# Patient Record
Sex: MALE | Race: WHITE | NOT HISPANIC OR LATINO | Employment: OTHER | URBAN - METROPOLITAN AREA
[De-identification: names, ages, dates, MRNs, and addresses within clinical notes are randomized per-mention and may not be internally consistent; named-entity substitution may affect disease eponyms.]

---

## 2021-09-13 ENCOUNTER — HISTORICAL (OUTPATIENT)
Dept: ADMINISTRATIVE | Facility: HOSPITAL | Age: 66
End: 2021-09-13

## 2022-04-11 ENCOUNTER — HISTORICAL (OUTPATIENT)
Dept: ADMINISTRATIVE | Facility: HOSPITAL | Age: 67
End: 2022-04-11
Payer: MEDICARE

## 2022-04-29 VITALS
HEIGHT: 76 IN | OXYGEN SATURATION: 96 % | SYSTOLIC BLOOD PRESSURE: 128 MMHG | WEIGHT: 243.63 LBS | BODY MASS INDEX: 29.67 KG/M2 | DIASTOLIC BLOOD PRESSURE: 72 MMHG

## 2022-05-09 ENCOUNTER — OFFICE VISIT (OUTPATIENT)
Dept: WOUND CARE | Facility: HOSPITAL | Age: 67
End: 2022-05-09
Attending: NURSE PRACTITIONER
Payer: MEDICARE

## 2022-05-09 VITALS
TEMPERATURE: 98 F | SYSTOLIC BLOOD PRESSURE: 133 MMHG | DIASTOLIC BLOOD PRESSURE: 66 MMHG | WEIGHT: 240 LBS | HEIGHT: 73 IN | HEART RATE: 54 BPM | BODY MASS INDEX: 31.81 KG/M2

## 2022-05-09 DIAGNOSIS — E77.8 HYPOPROTEINEMIA: ICD-10-CM

## 2022-05-09 DIAGNOSIS — L23.1 CONTACT DERMATITIS DUE TO ADHESIVE BANDAGE: ICD-10-CM

## 2022-05-09 DIAGNOSIS — T87.9 COMPLICATION OF AMPUTATION STUMP OF LEFT LOWER EXTREMITY: ICD-10-CM

## 2022-05-09 DIAGNOSIS — R73.03 PRE-DIABETES: ICD-10-CM

## 2022-05-09 DIAGNOSIS — A49.8 INFECTION DUE TO ENTEROBACTER CLOACAE: ICD-10-CM

## 2022-05-09 DIAGNOSIS — A49.01 MSSA (METHICILLIN SUSCEPTIBLE STAPHYLOCOCCUS AUREUS): ICD-10-CM

## 2022-05-09 DIAGNOSIS — F10.11 HISTORY OF ALCOHOL ABUSE: ICD-10-CM

## 2022-05-09 DIAGNOSIS — T81.89XD NON-HEALING SURGICAL WOUND, SUBSEQUENT ENCOUNTER: ICD-10-CM

## 2022-05-09 DIAGNOSIS — Z99.3 WHEELCHAIR CONFINEMENT STATUS: ICD-10-CM

## 2022-05-09 DIAGNOSIS — D50.9 IRON DEFICIENCY ANEMIA, UNSPECIFIED IRON DEFICIENCY ANEMIA TYPE: ICD-10-CM

## 2022-05-09 DIAGNOSIS — Z89.512 STATUS POST BELOW-KNEE AMPUTATION OF LEFT LOWER EXTREMITY: ICD-10-CM

## 2022-05-09 PROBLEM — T81.89XA NON-HEALING SURGICAL WOUND: Status: ACTIVE | Noted: 2022-05-09

## 2022-05-09 PROCEDURE — 99213 PR OFFICE/OUTPT VISIT, EST, LEVL III, 20-29 MIN: ICD-10-PCS | Mod: ,,, | Performed by: NURSE PRACTITIONER

## 2022-05-09 PROCEDURE — 99213 OFFICE O/P EST LOW 20 MIN: CPT | Mod: ,,, | Performed by: NURSE PRACTITIONER

## 2022-05-09 PROCEDURE — 99211 OFF/OP EST MAY X REQ PHY/QHP: CPT

## 2022-05-09 RX ORDER — GABAPENTIN 300 MG/1
300 CAPSULE ORAL 2 TIMES DAILY
COMMUNITY
Start: 2021-07-21

## 2022-05-09 RX ORDER — SPIRONOLACTONE 50 MG/1
50 TABLET, FILM COATED ORAL DAILY
COMMUNITY
Start: 2022-05-06

## 2022-05-09 RX ORDER — HYDRALAZINE HYDROCHLORIDE 25 MG/1
25 TABLET, FILM COATED ORAL DAILY
COMMUNITY
Start: 2022-05-06

## 2022-05-09 RX ORDER — POTASSIUM CHLORIDE 20 MEQ/1
20 TABLET, EXTENDED RELEASE ORAL DAILY
COMMUNITY
Start: 2022-05-06

## 2022-05-09 NOTE — PROGRESS NOTES
Chief Complaint:  Non-healing left BKA Stump Wound     History of Present Illness:  65 yo White male being seen for follow-up of post-operative left BKA stump following surgery done here @ [facility] on 7/9/2021, complicated by osteomyelitis, and dehiscence of incision line. Hospitalized 9/13/2021 - 9/16/2021 for OR debridement of non-healing stump wounds. Discharged to [facility] on 9/16/2021 for ongoing IV antibiotics and NPWT. Discharged home 10/13/2021, with [employer] ([employer]).  Mr. Bone's new phone number: [phone].     Hx includes pre-diabetes, CKD2, A. fib (Eliquis), anemia, GERD, BPH, HLD, HTN, bradycardia, hx ETOH abuse, and obesity (BMI 31.66). During July hospitalization, he was noted to be bradycardic and underwent a Lexiscan which was negative for any ischemia or scarring. Echo performed 7/5/2021 showed reduced ejection fraction at 45 to 50% and biatrial enlargement. Never smoker.  with one grown son, whom he is staying with presently. Has an apartment of his own that is handicap friendly inside; however, outside is not. PCP is Dr. Giovany Duron in [place]. Stopped drinking ETOH in September; denies ETOH use at this time. Male friend of 10 years is available to assist with transportation to appointments. Current wound care to left stump: cleaning with Microcyn, followed by Silver Alginate Advantage,followed by gauze, kerlix, and nylon stocking to keep all dressings in place.   Prescribed Clobetasol ointment to rash around wound, as-needed.   Advanced skin substitutes: [employer] [facility] applied #1 4/7/2022, and #2 on 4/14/2022. Wound did not respond to plain collagen. Mr. Bone cannot have any kind of tape, including paper tape and silicone tape, on skin. Bactrim DS prescribed 4/28/2022 Q12H X ten days.     Review of Most Recent Labs:  4/26/2022: eGFR >60, with CR 0.9. Albumin 4.1. HgbA1C 6.0%. H&H 15.1 & 45.3.   10/30/2021: eGFR 65, with CR 1.19. HgbA1C 5.6. LDL 90. WBC 7.2.  H&H 12.1 & 39.5. Plts 265. MCV 91.2. MCH 27.9.  10/24/2021: eGFR 62.0, with CR 1.24. Iron 38. Ferritin 67.3. Folate nl. Prealbumin 21.0. B12 lvl 490. WBC 7.6. RBC 3.92. H&H 10.8 &* 35.9. Plts 254. MCV and MCH low @ 27.6.   2021: eGFR >105. WBC 8.6. RBC 2.75. H&H 7.9 & 25.7. Plts 248. MCV and MCH normal.   2021: eGFR 69, with CR 1.14. AST 37. Albumin 2.6. H&H 9.6 & 31.1. Plts 355. MCV and MCH normal.   2021: HgbA1C 5.9.     Wound Culture Results:  2022: Enterobacter cloacae complex and MSSA. Both of those are susceptible to Bactrim.  2022: MSSA. Doxycycline began on 4/6/2022 X 5 days.    Imagin2021 CT of LLE: No evidence of osteo or septic arthritis.  No lower extremity vascular studies on chart.     Today 2022 :  Has finished Bactrim DS since last visit.  Had to use paper tape to keep dressing in place; no recurrent redness over stump.  Denies fevers, chills, odors, redness, pain, or yellow/green drainage.  No new rashes, lesions, or skin breakdown.     2022:  Mr. Bone relays that he took last round of Doxycycline Q12H X 5 days, instead of daily X 10 days, as he relayed last week. Redness around stump all resolved today; no longer itching around wound bed. Has not applied any kind of tape to stump. Denies fevers, chills. No pain in wound. No new skin breakdown, rashes, or other skin issues.    2022:  Doxycycline, ordered on 2022, was prescribed daily, instead of the intended twice/daily frequency. He took that for ten days, daily. Had severe reaction to Versatel and steristrips from last week's application of advanced skin substitute. Skin very red over stump. He left last week's dressing in place, as instructed.     2022:  Has finished Doxycycline, without any new s/e. Had some difficulty keeping kerlix in place last week, and can only use paper tape. He secured dressing with additional gauze and applied an ACE wrap. Denies fevers, chills, redness, pain,  odors, or strike-through drainage. No new skin breakdown, rashes, or other skin issues.    4/7/2020:  Picked up Doxycycline yesterday and has taken two doses; no new s/e with that. Mr. Bone agrees to move forward with an advanced skin substitute today; agrees to leave today's bandage on for one week, until he comes back to clinic. Experienced a reaction to a tape over past week; has a lot of redness around wound. No fevers, chills, odors, or yellow/green drainage. No new skin breakdown, rashes, or other skin issues.    4/1/2022:  States he is able to come to clinic weekly for me to manage his non-healing wound more aggressively. He is agreeable to a trial of a sample advanced skin substitute. Home health continues seeing him; nurse comes Monday. Denies fevers, chills, increased redness, pain, odors, or yellow/green drainage. No new skin breakdown, rashes, or other skin issues. Itching in stump is primarily up over thigh where tubigrip end lays.     3/4/2022:  Wound along middle area of stump has healed. Wound along lateral stump is healing in an abnormal shape. Touches base with [facility] monthly regarding prosthetic leg. No new complaints with wound or current wound care regimen. Has all wound care supplies in home. Home health continues seeing him once week. Denies fevers, chills, increased redness, increased pain, odors, or yellow/green drainage. No new skin breakdown, rashes, or other skin issues. Still wants to be seen on monthly basis.       Review of Systems:   Except as stated in HPI, all other 10 body systems normal      Physical Exam Vitals & Measurements:   Vitals:    05/09/22 0852   BP: 133/66   Pulse: (!) 54   Temp: 98.2 °F (36.8 °C)         General: Bradycardic, asymptomatic with; afebrile. Well-nourished; in no acute distress.   Respiratory: breathing even, quiet, and unlabored. No coughing.   Cardiovascular: No hemosiderin staining or varicosities. Scattered hair distribution over BLE,  including left BKA stump. Musculoskeletal: left BKA; arrived via wheelchair; full range of motion of all extremities  Neurologic: A&O X 3; cranial nerves grossly intact  Psychiatric: Calm, cooperative. Responses appropriate.  Integumentary:  Significant improvement with Silver Alginate Advantage and Bactrim DX X 10 days.      Laterally located full thickness wound:  100% beefy red granulating tissue.  No periwound erythema, purulent drainage, or odors. Wound margins without induration, bogginess, or fluctuance.  Wound dimensions significantly smaller from last visit.                     Assessment/Plan:    Contact Allergic Dermatitis:  Resolved with avoiding all tapes except for paper tape.  Prescribed Clobetasol 0.05% topical ointment twice/daily for any recurrent erythema and itching.   Mr. Bone cannot tolerate any kind of tape, Versatel, steristrips, or adhesive bandages.     Left BKA wound Infection:  Wound Culture Results:  4/21/2022: Enterobacter cloacae complex and MSSA. Both of those are susceptible to Bactrim.  Treated with Bactrim DS 1 tab Q12H for ten days.    Open wound to left BKA (originally) to fat layer:  Wound responded very well to Bactrim DS and two weeks of Silver Alginate Advantage.    Wound Care Today/New Wound Care Treatment Plan:  Clean wound with wound cleanser and pat dry.  Apply Iodiflex, followed by gauze squares, followed by kerlix, followed by nylon stocking to secure dressings. NO TAPE OR ADHESIVES OF ANY KIND CAN BE APPLIED TO STUMP.   Wound care to be done QOD. Instructions reinforced to Mr. Bone that changing dressings daily may delay wound healing, with rationale.   Mr. Bone's allergy to tapes and adhesives, including Versatel and steristrips, is most likely going to impede any future attempts at advanced skin substitutes.     Disruption external operation, subsequent visit (BKA due to osteomyelitis):  S/P left BKA 7/9/2021 with final closure on 7/12/2021.   S/P  debridement in OR here @ [facility] on 9/16/2021, with [facility], where he received [facility]WT and IV antibiotics.   [facility] is following for left leg prosthetic leg.  Will be fitted for that, once wound has healed.      Anemia:  This has been identified as a co-factor towards impaired wound healing.   4/26/2022: H&H 15.1 & 45.3.   10/30/2021: H&H 12.1 & 39.5. Plts 265. MCV 91.2. MCH 27.9.  10/24/2021: Iron 38. Ferritin 67.3. Folate nl. Prealbumin 21.0. B12 lvl 490. RBC 3.92. H&H 10.8 & 35.9. Plts 254. MCV and MCH low @ 27.6.   9/16/2021: RBC 2.75. H&H 7.9 & 25.7. Plts 248. MCV and MCH normal.   Prescribed ferrous sulfate 324 mg, with Vitamin C 500 mg daily. Handout given on foods higher in iron during previous visit.     Hypoproteinemia:  This has been identified as a co-factor towards impaired wound healing.   4/26/2022: Albumin 4.1. Pre-albumin was not drawn with labs, unfortunately.   10/24/2021: pre-albumin 21.0.  7/21/2021: Albumin 2.6.   Prescribed 2-week course of Matt on 11/5/21.   Handout given on foods higher in protein to include/increase in diet during previous wound clinic visit.    Pre-diabetes with peripheral neuropathy:  4/26/2022: HgbA1C 6.0%.   10/30/2021: eGFR 65, with CR 1.19. HgbA1C 5.6. LDL 90.   7/2/2021: HgbA1C 5.9. Not prescribed any antidiabetic medications.   Being managed by PCP.    Hx of ETOH abuse:  Stopped drinking per patient self-report.     Wheelchair confined status:  This has been identified as a co-factor towards impaired wound healing.   Has not had any kind of contact with orthotist for prosthetic leg fitting. Will address this later, as wound progresses toward healing phase.             RTC in one week.  Instructed on s/s of deterioration to call wound clinic for promptly in between clinic visits so we can schedule him that day, or the next day. Copy of today's visit note faxed to [employer].

## 2022-05-16 ENCOUNTER — OFFICE VISIT (OUTPATIENT)
Dept: WOUND CARE | Facility: HOSPITAL | Age: 67
End: 2022-05-16
Attending: NURSE PRACTITIONER
Payer: MEDICARE

## 2022-05-16 VITALS
SYSTOLIC BLOOD PRESSURE: 116 MMHG | RESPIRATION RATE: 18 BRPM | TEMPERATURE: 98 F | WEIGHT: 240 LBS | HEIGHT: 73 IN | DIASTOLIC BLOOD PRESSURE: 69 MMHG | HEART RATE: 66 BPM | BODY MASS INDEX: 31.81 KG/M2

## 2022-05-16 DIAGNOSIS — D50.9 IRON DEFICIENCY ANEMIA, UNSPECIFIED IRON DEFICIENCY ANEMIA TYPE: ICD-10-CM

## 2022-05-16 DIAGNOSIS — E77.8 HYPOPROTEINEMIA: ICD-10-CM

## 2022-05-16 DIAGNOSIS — A49.01 MSSA (METHICILLIN SUSCEPTIBLE STAPHYLOCOCCUS AUREUS): ICD-10-CM

## 2022-05-16 DIAGNOSIS — F10.11 HISTORY OF ALCOHOL ABUSE: ICD-10-CM

## 2022-05-16 DIAGNOSIS — Z99.3 WHEELCHAIR CONFINEMENT STATUS: ICD-10-CM

## 2022-05-16 DIAGNOSIS — Z89.512 STATUS POST BELOW-KNEE AMPUTATION OF LEFT LOWER EXTREMITY: ICD-10-CM

## 2022-05-16 DIAGNOSIS — R73.03 PRE-DIABETES: ICD-10-CM

## 2022-05-16 DIAGNOSIS — T87.9 COMPLICATION OF AMPUTATION STUMP OF LEFT LOWER EXTREMITY: ICD-10-CM

## 2022-05-16 DIAGNOSIS — T81.89XD NON-HEALING SURGICAL WOUND, SUBSEQUENT ENCOUNTER: Primary | ICD-10-CM

## 2022-05-16 DIAGNOSIS — A49.8 INFECTION DUE TO ENTEROBACTER CLOACAE: ICD-10-CM

## 2022-05-16 DIAGNOSIS — L23.1 CONTACT DERMATITIS DUE TO ADHESIVE BANDAGE: ICD-10-CM

## 2022-05-16 PROCEDURE — 99213 OFFICE O/P EST LOW 20 MIN: CPT | Mod: ,,, | Performed by: NURSE PRACTITIONER

## 2022-05-16 PROCEDURE — 99211 OFF/OP EST MAY X REQ PHY/QHP: CPT

## 2022-05-16 PROCEDURE — 99213 PR OFFICE/OUTPT VISIT, EST, LEVL III, 20-29 MIN: ICD-10-PCS | Mod: ,,, | Performed by: NURSE PRACTITIONER

## 2022-05-16 RX ORDER — IRON 18 MG
TABLET ORAL
COMMUNITY
Start: 2021-10-13

## 2022-05-16 NOTE — PROGRESS NOTES
Chief Complaint:  Non-healing left BKA Stump Wound     History of Present Illness:  67 yo White male being seen for follow-up of post-operative left BKA stump following surgery done here @ [facility] on 7/9/2021, complicated by osteomyelitis, and dehiscence of incision line. Hospitalized 9/13/2021 - 9/16/2021 for OR debridement of significant non-healing stump wound. Discharged to [facility] on 9/16/2021 for ongoing IV antibiotics and NPWT. Discharged home 10/13/2021, with [employer] ([place]  Mr. Bone's new phone number: [phone].     Hx includes pre-diabetes, CKD2, A. fib (Eliquis), anemia, GERD, BPH, HLD, HTN, bradycardia, hx ETOH abuse, and obesity (BMI 31.66). During July hospitalization, he was noted to be bradycardic and underwent a Lexiscan which was negative for any ischemia or scarring. Echo performed 7/5/2021 showed reduced ejection fraction at 45 to 50% and biatrial enlargement. Never smoker.  with one grown son, whom he is staying with presently. Has an apartment of his own that is handicap friendly inside; however, outside is not. PCP is Dr. Giovany Duron in [place]. Stopped drinking ETOH in September; denies ETOH use at this time. Male friend of 10 years is available to assist with transportation to appointments. Current wound care to left stump: cleaning with Microcyn, followed by Iodiflex, followed by gauze, kerlix, and nylon stocking to keep all dressings in place.   Prescribed Clobetasol ointment to rash around wound, as-needed.   Advanced skin substitutes: [employer] [facility] applied #1 4/7/2022, and #2 on 4/14/2022. Wound did not respond to plain collagen. Mr. Bone cannot have any kind of tape, including paper tape and silicone tape, on skin. Bactrim DS prescribed 4/28/2022 Q12H X ten days.     Review of Most Recent Labs:  4/26/2022: eGFR >60, with CR 0.9. Albumin 4.1. HgbA1C 6.0%. H&H 15.1 & 45.3.   10/30/2021: eGFR 65, with CR 1.19. HgbA1C 5.6. LDL 90. WBC 7.2. H&H  12.1 & 39.5. Plts 265. MCV 91.2. MCH 27.9.  10/24/2021: eGFR 62.0, with CR 1.24. Iron 38. Ferritin 67.3. Folate nl. Prealbumin 21.0. B12 lvl 490. WBC 7.6. RBC 3.92. H&H 10.8 &* 35.9. Plts 254. MCV and MCH low @ 27.6.   2021: eGFR >105. WBC 8.6. RBC 2.75. H&H 7.9 & 25.7. Plts 248. MCV and MCH normal.   2021: eGFR 69, with CR 1.14. AST 37. Albumin 2.6. H&H 9.6 & 31.1. Plts 355. MCV and MCH normal.   2021: HgbA1C 5.9.     Wound Culture Results:  2022: Enterobacter cloacae complex and MSSA. Both of those are susceptible to Bactrim.  2022: MSSA. Doxycycline began on 4/6/2022 X 5 days.    Imagin2021 CT of LLE: No evidence of osteo or septic arthritis.  No lower extremity vascular studies on chart.     Today 2022:  No reported complications with wound care or current treatment plan.  Has all wound care supplies in home.  Denies fevers, chills, odors, redness, pain, or yellow/green drainage.  No new rashes, lesions, or skin breakdown.  Agreeable to trying to leave today's dressing on until Thursday.  Is able to use papertape, per self-report, without any redness.    2022:  Has finished Bactrim DS since last visit.  Had to use paper tape to keep dressing in place; no recurrent redness over stump.  Denies fevers, chills, odors, redness, pain, or yellow/green drainage.  No new rashes, lesions, or skin breakdown.     2022:  Mr. Bone relays that he took last round of Doxycycline Q12H X 5 days, instead of daily X 10 days, as he relayed last week. Redness around stump all resolved today; no longer itching around wound bed. Has not applied any kind of tape to stump. Denies fevers, chills. No pain in wound. No new skin breakdown, rashes, or other skin issues.    2022:  Doxycycline, ordered on 2022, was prescribed daily, instead of the intended twice/daily frequency. He took that for ten days, daily. Had severe reaction to Versatel and steristrips from last week's  application of advanced skin substitute. Skin very red over stump. He left last week's dressing in place, as instructed.     4/14/2022:  Has finished Doxycycline, without any new s/e. Had some difficulty keeping kerlix in place last week, and can only use paper tape. He secured dressing with additional gauze and applied an ACE wrap. Denies fevers, chills, redness, pain, odors, or strike-through drainage. No new skin breakdown, rashes, or other skin issues.    4/7/2020:  Picked up Doxycycline yesterday and has taken two doses; no new s/e with that. Mr. Bone agrees to move forward with an advanced skin substitute today; agrees to leave today's bandage on for one week, until he comes back to clinic. Experienced a reaction to a tape over past week; has a lot of redness around wound. No fevers, chills, odors, or yellow/green drainage. No new skin breakdown, rashes, or other skin issues.    4/1/2022:  States he is able to come to clinic weekly for me to manage his non-healing wound more aggressively. He is agreeable to a trial of a sample advanced skin substitute. Home health continues seeing him; nurse comes Monday. Denies fevers, chills, increased redness, pain, odors, or yellow/green drainage. No new skin breakdown, rashes, or other skin issues. Itching in stump is primarily up over thigh where tubigrip end lays.     3/4/2022:  Wound along middle area of stump has healed. Wound along lateral stump is healing in an abnormal shape. Touches base with [facility] monthly regarding prosthetic leg. No new complaints with wound or current wound care regimen. Has all wound care supplies in home. Home health continues seeing him once week. Denies fevers, chills, increased redness, increased pain, odors, or yellow/green drainage. No new skin breakdown, rashes, or other skin issues. Still wants to be seen on monthly basis.       Review of Systems:   Except as stated in HPI, all other 10 body systems normal    Patient  Active Problem List   Diagnosis    Status post below-knee amputation of left lower extremity    Non-healing surgical wound    Complication of amputation stump of left lower extremity    Pre-diabetes    Contact dermatitis due to adhesive bandage    Infection due to Enterobacter cloacae    MSSA (methicillin susceptible Staphylococcus aureus)    Iron deficiency anemia    Hypoproteinemia    History of alcohol abuse    Wheelchair confinement status       Current Outpatient Medications on File Prior to Visit   Medication Sig Dispense Refill    apixaban (ELIQUIS) 2.5 mg Tab Take 5 mg by mouth once daily at 6am.      gabapentin (NEURONTIN) 300 MG capsule Take 300 mg by mouth 2 (two) times daily.      hydrALAZINE (APRESOLINE) 25 MG tablet Take 25 mg by mouth once daily at 6am.      iron 18 mg Tab 65 mg      potassium chloride SA (K-DUR,KLOR-CON) 20 MEQ tablet Take 20 mEq by mouth once daily at 6am.      spironolactone (ALDACTONE) 50 MG tablet Take 50 mg by mouth once daily at 6am.       No current facility-administered medications on file prior to visit.         Physical Exam Vitals & Measurements:   Vitals:    05/16/22 0813   BP: 116/69   Pulse: 66   Resp: 18   Temp: 98.4 °F (36.9 °C)         General:  VSS; afebrile. Well-nourished; in no acute distress.   Respiratory: breathing even, quiet, and unlabored. No coughing.   Cardiovascular: No hemosiderin staining or varicosities. Scattered hair distribution over BLE, including left BKA stump.   Musculoskeletal: left BKA; arrived via wheelchair; full range of motion of all extremities  Neurologic: A&O X 3; cranial nerves grossly intact  Psychiatric: Calm, cooperative. Responses appropriate.  Integumentary:  Laterally located full thickness wound:  100% beefy red granulating tissue, after I mechanically debrided wound with Microcyn-moistened gauze.  No periwound erythema, purulent drainage, or odors. Wound margins without induration, bogginess, or fluctuance.   Wound dimensions smaller from last visit; however, wound is healing in an uneven manner, which does not reflect healing over past week.              Wound 05/09/22 0931 Left lateral Leg (Active)   05/09/22 0931    Pre-existing:    Primary Wound Type:    Side: Left   Orientation: lateral   Location: Leg   Wound Number:    Ankle-Brachial Index:    Pulses:    Removal Indication and Assessment:    Wound Outcome:    (Retired) Wound Type:    (Retired) Wound Length (cm):    (Retired) Wound Width (cm):    (Retired) Depth (cm):    Wound Description (Comments):    Removal Indications:    Drainage Amount Scant 05/16/22 0827   Wound Length (cm) 0.7 cm 05/16/22 0827   Wound Width (cm) 2 cm 05/16/22 0827   Wound Depth (cm) 0.1 cm 05/16/22 0827   Wound Volume (cm^3) 0.14 cm^3 05/16/22 0827   Wound Surface Area (cm^2) 1.4 cm^2 05/16/22 0827                     Assessment/Plan:    Contact Allergic Dermatitis:  Resolved with avoiding all tapes except for paper tape.  Prescribed Clobetasol 0.05% topical ointment twice/daily for any recurrent erythema and itching.   Mr. Bone cannot tolerate any kind of tape, Versatel, steristrips, or adhesive bandages.     Left BKA wound Infection:  Wound Culture Results:  4/21/2022: Enterobacter cloacae complex and MSSA.   Treated with Bactrim DS 1 tab Q12H for ten days.    Open wound to left BKA (originally) to fat layer:  Mr. Bone relays that he was sent to a nursing home when he was discharged from [facility] after amputation due to insurance coverage.  He did not have skilled nursing at that facility.  Mr. Bone showed me a photo of wounds before September 2020 OR debridement.  Wound was very large and infected.    Wound Care Today/Continued Wound Care Treatment Plan:  Clean wound with wound cleanser and pat dry.  Apply Iodiflex, followed by gauze squares, followed by kerlix, followed by nylon stocking to secure dressings. NO TAPE OR ADHESIVES OF ANY KIND CAN BE APPLIED TO STUMP.   Wound care  to be done Thursday of this week. Instructions reinforced to Mr. Bone that changing dressings daily may delay wound healing, with rationale.   Mr. Bone's allergy to tapes and adhesives, including Versatel and steristrips, impedes any future attempts at advanced skin substitutes.     Disruption external operation, subsequent visit (BKA due to osteomyelitis):  S/P left BKA 7/9/2021 with final closure on 7/12/2021.   S/P debridement in OR here @ [facility] on 9/16/2021, with [facility], where he received NPWT and IV antibiotics.   [facility] is following for left leg prosthetic leg.  Will be fitted for that, once wound has healed.      Anemia:  This has been identified as a co-factor towards impaired wound healing.   4/26/2022: H&H 15.1 & 45.3.   10/30/2021: H&H 12.1 & 39.5. Plts 265. MCV 91.2. MCH 27.9.  10/24/2021: Iron 38. Ferritin 67.3. Folate nl. Prealbumin 21.0. B12 lvl 490. RBC 3.92. H&H 10.8 & 35.9. Plts 254. MCV and MCH low @ 27.6.   9/16/2021: RBC 2.75. H&H 7.9 & 25.7. Plts 248. MCV and MCH normal.   Prescribed ferrous sulfate 324 mg, with Vitamin C 500 mg daily. Handout given on foods higher in iron during previous visit.     Hypoproteinemia:  This has been identified as a co-factor towards impaired wound healing.   4/26/2022: Albumin 4.1. Pre-albumin was not drawn with labs, unfortunately.   10/24/2021: pre-albumin 21.0.  7/21/2021: Albumin 2.6.   Prescribed 2-week course of Matt on 11/5/21.   Handout given on foods higher in protein to include/increase in diet during previous wound clinic visit.    Pre-diabetes with peripheral neuropathy:  4/26/2022: HgbA1C 6.0%.   10/30/2021: eGFR 65, with CR 1.19. HgbA1C 5.6. LDL 90.   7/2/2021: HgbA1C 5.9. Not prescribed any antidiabetic medications.   Being managed by PCP.    Hx of ETOH abuse:  Stopped drinking per patient self-report.     Wheelchair confined status:  This has been identified as a co-factor towards impaired wound healing.   Has not  had any kind of contact with orthotist for prosthetic leg fitting. Will address this later, as wound progresses toward healing phase.             RTC in one week.  Instructed on s/s of deterioration to call wound clinic for promptly in between clinic visits so we can schedule him that day, or the next day.

## 2022-05-23 ENCOUNTER — HOSPITAL ENCOUNTER (OUTPATIENT)
Dept: WOUND CARE | Facility: HOSPITAL | Age: 67
Discharge: HOME | End: 2022-05-23
Attending: NURSE PRACTITIONER
Payer: MEDICARE

## 2022-05-23 ENCOUNTER — HOSPITAL ENCOUNTER (OUTPATIENT)
Dept: RADIOLOGY | Facility: HOSPITAL | Age: 67
Discharge: HOME | End: 2022-05-23
Attending: NURSE PRACTITIONER
Payer: MEDICARE

## 2022-05-23 VITALS
RESPIRATION RATE: 20 BRPM | TEMPERATURE: 98 F | DIASTOLIC BLOOD PRESSURE: 67 MMHG | HEART RATE: 57 BPM | SYSTOLIC BLOOD PRESSURE: 133 MMHG

## 2022-05-23 DIAGNOSIS — Z89.512 STATUS POST BELOW-KNEE AMPUTATION OF LEFT LOWER EXTREMITY: ICD-10-CM

## 2022-05-23 DIAGNOSIS — F10.11 HISTORY OF ALCOHOL ABUSE: ICD-10-CM

## 2022-05-23 DIAGNOSIS — E77.8 HYPOPROTEINEMIA: ICD-10-CM

## 2022-05-23 DIAGNOSIS — L23.1 CONTACT DERMATITIS DUE TO ADHESIVE BANDAGE: ICD-10-CM

## 2022-05-23 DIAGNOSIS — T81.89XD NON-HEALING SURGICAL WOUND, SUBSEQUENT ENCOUNTER: ICD-10-CM

## 2022-05-23 DIAGNOSIS — T87.9 COMPLICATION OF AMPUTATION STUMP OF LEFT LOWER EXTREMITY: ICD-10-CM

## 2022-05-23 DIAGNOSIS — Z87.39 HISTORY OF OSTEOMYELITIS: ICD-10-CM

## 2022-05-23 DIAGNOSIS — Z99.3 WHEELCHAIR CONFINEMENT STATUS: ICD-10-CM

## 2022-05-23 DIAGNOSIS — D50.8 OTHER IRON DEFICIENCY ANEMIA: ICD-10-CM

## 2022-05-23 DIAGNOSIS — R73.03 PRE-DIABETES: ICD-10-CM

## 2022-05-23 DIAGNOSIS — T81.89XD NON-HEALING SURGICAL WOUND, SUBSEQUENT ENCOUNTER: Primary | ICD-10-CM

## 2022-05-23 PROBLEM — A49.01 MSSA (METHICILLIN SUSCEPTIBLE STAPHYLOCOCCUS AUREUS): Status: RESOLVED | Noted: 2022-05-09 | Resolved: 2022-05-23

## 2022-05-23 PROBLEM — A49.8 INFECTION DUE TO ENTEROBACTER CLOACAE: Status: RESOLVED | Noted: 2022-05-09 | Resolved: 2022-05-23

## 2022-05-23 PROCEDURE — 73560 X-RAY EXAM OF KNEE 1 OR 2: CPT | Mod: TC,LT

## 2022-05-23 PROCEDURE — 99211 OFF/OP EST MAY X REQ PHY/QHP: CPT

## 2022-05-23 PROCEDURE — 99213 OFFICE O/P EST LOW 20 MIN: CPT | Mod: ,,, | Performed by: NURSE PRACTITIONER

## 2022-05-23 PROCEDURE — 99213 PR OFFICE/OUTPT VISIT, EST, LEVL III, 20-29 MIN: ICD-10-PCS | Mod: ,,, | Performed by: NURSE PRACTITIONER

## 2022-05-23 NOTE — PROGRESS NOTES
Chief Complaint:  Non-healing left BKA Stump Wound     History of Present Illness:  65 yo White male being seen for follow-up of post-operative left BKA stump following surgery done here @ [facility] on 7/9/2021, complicated by osteomyelitis, and dehiscence of incision line. Hospitalized 9/13/2021 - 9/16/2021 for OR debridement of significant non-healing stump wound. Discharged to [facility] on 9/16/2021 for ongoing IV antibiotics and NPWT. Discharged home 10/13/2021, with [employer]  Mr. Bone's new phone number: [phone].     Hx includes pre-diabetes, CKD2, A. fib (Eliquis), anemia, GERD, BPH, HLD, HTN, bradycardia, hx ETOH abuse (4 beers/day reported 5/23/2022), and obesity (BMI 31.66). During July hospitalization, he was noted to be bradycardic and underwent a Lexiscan which was negative for any ischemia or scarring. Echo performed 7/5/2021 showed reduced ejection fraction at 45 to 50% and biatrial enlargement. Never smoker.  with one grown son, whom he is staying with presently. Has an apartment of his own that is handicap friendly inside; however, outside is not. PCP is Dr. Giovany Duron in [place].  Current wound care to left stump: cleaning with Microcyn, followed by Iodiflex, followed by gauze, kerlix, and nylon stocking to keep all dressings in place.   Prescribed Clobetasol ointment to rash around wound, as-needed.   Advanced skin substitutes: [employer] [facility] applied #1 4/7/2022, and #2 on 4/14/2022. Wound did not respond to plain collagen. Mr. Bone cannot have any kind of tape, including paper tape and silicone tape, on skin. Bactrim DS prescribed 4/28/2022 Q12H X ten days.     Review of Most Recent Labs:  4/26/2022: eGFR >60, with CR 0.9. Albumin 4.1. HgbA1C 6.0%. H&H 15.1 & 45.3.   10/30/2021: eGFR 65, with CR 1.19. HgbA1C 5.6. LDL 90. WBC 7.2. H&H 12.1 & 39.5. Plts 265. MCV 91.2. MCH 27.9.  10/24/2021: eGFR 62.0, with CR 1.24. Iron 38. Ferritin 67.3. Folate nl.  Prealbumin 21.0. B12 lvl 490. WBC 7.6. RBC 3.92. H&H 10.8 &* 35.9. Plts 254. MCV and MCH low @ 27.6.   2021: eGFR >105. WBC 8.6. RBC 2.75. H&H 7.9 & 25.7. Plts 248. MCV and MCH normal.   2021: eGFR 69, with CR 1.14. AST 37. Albumin 2.6. H&H 9.6 & 31.1. Plts 355. MCV and MCH normal.   2021: HgbA1C 5.9.     Wound Culture Results:  2022: Enterobacter cloacae complex and MSSA. Both of those are susceptible to Bactrim.  Treated with 10 days of Bactrim BID (2022); was unable to reach Mr. Bone to change Abx.  2022: MSSA. Doxycycline began on 4/6/2022 X 5 days.    Imagin2021 CT of LLE: No evidence of osteo or septic arthritis.  No lower extremity vascular studies on chart.       Today 2022:  Discouraged that wound measurements are not smaller from last visit.  Left Iodiplex on, as instructed last visit, until home health nurse changed it on Thursday.  No reported complications with wound care or current treatment plan.  Denies fevers, chills, odors, redness, pain, or yellow/green drainage.  No new rashes, lesions, or skin breakdown.   Admitted to resumption of drinking; drinks 4 beers a day, per self-report (12 oz).     2022:  No reported complications with wound care or current treatment plan.  Has all wound care supplies in home.  Denies fevers, chills, odors, redness, pain, or yellow/green drainage.  No new rashes, lesions, or skin breakdown.  Agreeable to trying to leave today's dressing on until Thursday.  Is able to use papertape, per self-report, without any redness.    2022:  Has finished Bactrim DS since last visit.  Had to use paper tape to keep dressing in place; no recurrent redness over stump.  Denies fevers, chills, odors, redness, pain, or yellow/green drainage.  No new rashes, lesions, or skin breakdown.     2022:  Mr. Bone relays that he took last round of Doxycycline Q12H X 5 days, instead of daily X 10 days, as he relayed last week. Redness  around stump all resolved today; no longer itching around wound bed. Has not applied any kind of tape to stump. Denies fevers, chills. No pain in wound. No new skin breakdown, rashes, or other skin issues.    4/21/2022:  Doxycycline, ordered on 4/6/2022, was prescribed daily, instead of the intended twice/daily frequency. He took that for ten days, daily. Had severe reaction to Versatel and steristrips from last week's application of advanced skin substitute. Skin very red over stump. He left last week's dressing in place, as instructed.     4/14/2022:  Has finished Doxycycline, without any new s/e. Had some difficulty keeping kerlix in place last week, and can only use paper tape. He secured dressing with additional gauze and applied an ACE wrap. Denies fevers, chills, redness, pain, odors, or strike-through drainage. No new skin breakdown, rashes, or other skin issues.    4/7/2020:  Picked up Doxycycline yesterday and has taken two doses; no new s/e with that. Mr. Bone agrees to move forward with an advanced skin substitute today; agrees to leave today's bandage on for one week, until he comes back to clinic. Experienced a reaction to a tape over past week; has a lot of redness around wound. No fevers, chills, odors, or yellow/green drainage. No new skin breakdown, rashes, or other skin issues.    4/1/2022:  States he is able to come to clinic weekly for me to manage his non-healing wound more aggressively. He is agreeable to a trial of a sample advanced skin substitute. Home health continues seeing him; nurse comes Monday. Denies fevers, chills, increased redness, pain, odors, or yellow/green drainage. No new skin breakdown, rashes, or other skin issues. Itching in stump is primarily up over thigh where tubigrip end lays.     3/4/2022:  Wound along middle area of stump has healed. Wound along lateral stump is healing in an abnormal shape. Touches base with [facility] monthly regarding prosthetic leg. No  new complaints with wound or current wound care regimen. Has all wound care supplies in home. Home health continues seeing him once week. Denies fevers, chills, increased redness, increased pain, odors, or yellow/green drainage. No new skin breakdown, rashes, or other skin issues. Still wants to be seen on monthly basis.       Review of Systems:   Except as stated in HPI, all other 10 body systems normal      Patient Active Problem List   Diagnosis    Status post below-knee amputation of left lower extremity    Non-healing surgical wound    Complication of amputation stump of left lower extremity    Pre-diabetes    Contact dermatitis due to adhesive bandage    Iron deficiency anemia    Hypoproteinemia    History of alcohol abuse    Wheelchair confinement status    History of osteomyelitis         Current Outpatient Medications on File Prior to Encounter   Medication Sig Dispense Refill    apixaban (ELIQUIS) 2.5 mg Tab Take 5 mg by mouth once daily at 6am.      gabapentin (NEURONTIN) 300 MG capsule Take 300 mg by mouth 2 (two) times daily.      hydrALAZINE (APRESOLINE) 25 MG tablet Take 25 mg by mouth once daily at 6am.      iron 18 mg Tab 65 mg      potassium chloride SA (K-DUR,KLOR-CON) 20 MEQ tablet Take 20 mEq by mouth once daily at 6am.      spironolactone (ALDACTONE) 50 MG tablet Take 50 mg by mouth once daily at 6am.       No current facility-administered medications on file prior to encounter.         Physical Exam Vitals & Measurements:   Vitals:    05/23/22 0833   BP: 133/67   Pulse: (!) 57   Resp: 20   Temp: 98.4 °F (36.9 °C)         General:  VSS; afebrile. Well-nourished; in no acute distress.   Respiratory: breathing even, quiet, and unlabored. No coughing.   Cardiovascular: No hemosiderin staining or varicosities. Scattered hair distribution over BLE, including left BKA stump.   Musculoskeletal: left BKA; arrived via wheelchair; full range of motion of all extremities  Neurologic: A&O X  3; cranial nerves grossly intact  Psychiatric: Calm, cooperative. Responses appropriate.  Integumentary:  Laterally located full thickness wound:  100% beefy red granulating tissue, after I mechanically debrided wound with sterile cotton applicator.  No periwound erythema, purulent drainage, or odors. Wound margins without induration, bogginess, or fluctuance.  Wound dimensions unchanged from last visit.           Wound 05/09/22 0931 Venous Ulcer Left lateral Leg #1 (Active)   05/09/22 0931    Pre-existing:    Primary Wound Type: Venous ulcer   Side: Left   Orientation: lateral   Location: Leg   Wound Number: #1   Ankle-Brachial Index:    Pulses:    Removal Indication and Assessment:    Wound Outcome:    (Retired) Wound Type:    (Retired) Wound Length (cm):    (Retired) Wound Width (cm):    (Retired) Depth (cm):    Wound Description (Comments):    Removal Indications:    Wound Image   05/23/22 0835   Dressing Appearance Clean;Moist drainage 05/23/22 0835   Drainage Amount Small 05/23/22 0835   Drainage Characteristics/Odor Serosanguineous 05/23/22 0835   Appearance Pink 05/23/22 0835   Tissue loss description Full thickness 05/23/22 0835   Periwound Area Intact 05/23/22 0835   Wound Length (cm) 0.7 cm 05/23/22 0835   Wound Width (cm) 2 cm 05/23/22 0835   Wound Depth (cm) 0.1 cm 05/23/22 0835   Wound Volume (cm^3) 0.14 cm^3 05/23/22 0835   Wound Surface Area (cm^2) 1.4 cm^2 05/23/22 0835   Dressing Change Due 05/30/22 05/23/22 0835               Assessment/Plan:    Disruption external operation, subsequent visit (BKA due to osteomyelitis):  S/P left BKA 7/9/2021 with final closure on 7/12/2021.   S/P debridement in OR here @ [facility] on 9/16/2021, with LTACH stay @ [facility], where he received NPWT and IV antibiotics.   [facility] is following for left leg prosthetic leg.  Will be fitted for that, once wound has healed.      Open wound to left BKA (originally) to fat layer:  Wound healing stalled with Iodiflex.   Attempts at advanced skin substitutes unsuccessful due to severe contact allergic dermatitis with products used.   No s/s of localized infection.   Mr. Bone self-admitted to drinking again; drinking 4 beers/day, per self-report.  Nutritional status may be impeding wound healing, which I discussed with him today.    O:  Xray of left knee joint to r/o osteomyelitis.   O:  Pre-albumin, Thiamine, ESR, and CRP.   5/17/2022:  Mr. Bone relays that he was sent to a nursing home when he was discharged from [facility] after amputation due to insurance coverage.  He did not have skilled nursing at that facility.  Mr. Bone showed me a photo of wounds before September 2020 OR debridement.  Wound was very large and infected.    Wound Care Today/New Wound Care Treatment Plan:  Clean wound with wound cleanser and pat dry.  Apply silver collagen, followed by gauze squares, followed by kerlix, followed by nylon stocking to secure dressings.  May use paper tape.    Wound care to be done QOD.   If wound does not respond to silver collagen, I will then trial Clobetasol 0.05% cream directly onto wound, and treat as an atypical inflammatory wound.     History of Osteomyelitis:  O:  Xray of left stump to r/o chronic osteomyelitis.  Treated with 6 weeks of IV antibiotics at [facility] back in October 2021.    O:  CRP and ESR     Contact Allergic Dermatitis:  Resolved with avoiding all tapes except for paper tape.  Prescribed Clobetasol 0.05% topical ointment twice/daily for any recurrent erythema and itching.   Mr. Bone cannot tolerate tapes other than paper tape, Versatel, steristrips, or adhesive bandages.   Unable to resume advanced skin substitutes due to severe allergic contact dermatitis with steristrips and Versatel to secure skin substitutes.      Left BKA wound Infection:  Treated on 4/28/2022.    Wound Culture Results:  4/21/2022: Enterobacter cloacae complex and MSSA.   Treated with Bactrim DS 1 tab Q12H for ten days  (4/28/2022).  We were unable to reach Mr. Bone to change antibiotics until he RTC.    No s/s of infection today.  Wound is very healthy in appearance; becomes beefy red in color with mechanical debridement.      Hx of ETOH abuse:  5/23/2022:  Self-reported intake of 4 beers/day.   O:  Thiamine level and pre-albumin.   Discussed role ETOH abuse plays in delayed wound healing.  Cessation encouraged.      Anemia:  This has been identified as a co-factor towards impaired wound healing.   4/26/2022: H&H 15.1 & 45.3.   10/30/2021: H&H 12.1 & 39.5. Plts 265. MCV 91.2. MCH 27.9.  10/24/2021: Iron 38. Ferritin 67.3. Folate nl. Prealbumin 21.0. B12 lvl 490. RBC 3.92. H&H 10.8 & 35.9. Plts 254. MCV and MCH low @ 27.6.   9/16/2021: RBC 2.75. H&H 7.9 & 25.7. Plts 248. MCV and MCH normal.   Prescribed ferrous sulfate 324 mg, with Vitamin C 500 mg daily. Handout given on foods higher in iron during previous visit.     Hypoproteinemia:  This has been identified as a co-factor towards impaired wound healing.   O:  Pre-albumin  4/26/2022: Albumin 4.1.    10/24/2021: pre-albumin 21.0.  7/21/2021: Albumin 2.6.   Prescribed 2-week course of Matt on 11/5/21.   Handout given on foods higher in protein to include/increase in diet during previous wound clinic visit.    Pre-diabetes with peripheral neuropathy:  4/26/2022: HgbA1C 6.0%.   10/30/2021: eGFR 65, with CR 1.19. HgbA1C 5.6. LDL 90.   7/2/2021: HgbA1C 5.9. Not prescribed any antidiabetic medications.   Being managed by PCP.    Wheelchair confined status:  This has been identified as a co-factor towards impaired wound healing.   Has not had any kind of contact with orthotist for prosthetic leg fitting. Will address this later, as wound progresses toward healing phase.             RTC in one week.  Instructed on s/s of deterioration to call wound clinic for promptly in between clinic visits so we can schedule him that day, or the next day.   Copy of today's visit note faxed to  [facility].

## 2022-05-24 ENCOUNTER — TELEPHONE (OUTPATIENT)
Dept: WOUND CARE | Facility: HOSPITAL | Age: 67
End: 2022-05-24
Payer: MEDICARE

## 2022-05-24 NOTE — TELEPHONE ENCOUNTER
I attempted to reach Mr. Bone at his new phone number on my notes.  Unable to leave message due to voicemail not being set up.  He did not have labs done from this week's visit.  I called [facility] and gave verbal orders for:  Pre-albumin, thiamine level, ESR, and CRP.  Message left on 's voicemail.

## 2022-05-30 ENCOUNTER — HOSPITAL ENCOUNTER (OUTPATIENT)
Dept: WOUND CARE | Facility: HOSPITAL | Age: 67
Discharge: HOME | End: 2022-05-30
Attending: NURSE PRACTITIONER
Payer: MEDICARE

## 2022-05-30 VITALS — HEART RATE: 74 BPM | TEMPERATURE: 98 F | DIASTOLIC BLOOD PRESSURE: 78 MMHG | SYSTOLIC BLOOD PRESSURE: 139 MMHG

## 2022-05-30 DIAGNOSIS — T87.9 COMPLICATION OF AMPUTATION STUMP OF LEFT LOWER EXTREMITY: ICD-10-CM

## 2022-05-30 DIAGNOSIS — Z89.512 STATUS POST BELOW-KNEE AMPUTATION OF LEFT LOWER EXTREMITY: ICD-10-CM

## 2022-05-30 DIAGNOSIS — F43.25 ADJUSTMENT DISORDER WITH MIXED DISTURBANCE OF EMOTIONS AND CONDUCT: ICD-10-CM

## 2022-05-30 DIAGNOSIS — F10.10 ALCOHOL ABUSE: ICD-10-CM

## 2022-05-30 DIAGNOSIS — R73.03 PRE-DIABETES: ICD-10-CM

## 2022-05-30 DIAGNOSIS — Z99.3 WHEELCHAIR CONFINEMENT STATUS: ICD-10-CM

## 2022-05-30 DIAGNOSIS — D50.8 OTHER IRON DEFICIENCY ANEMIA: ICD-10-CM

## 2022-05-30 DIAGNOSIS — Z87.39 HISTORY OF OSTEOMYELITIS: ICD-10-CM

## 2022-05-30 DIAGNOSIS — T81.89XD NON-HEALING SURGICAL WOUND, SUBSEQUENT ENCOUNTER: Primary | ICD-10-CM

## 2022-05-30 DIAGNOSIS — R60.1 GENERALIZED EDEMA: ICD-10-CM

## 2022-05-30 PROBLEM — D64.9 CHRONIC ANEMIA: Status: ACTIVE | Noted: 2022-05-30

## 2022-05-30 PROBLEM — I48.91 ATRIAL FIBRILLATION: Status: ACTIVE | Noted: 2021-10-12

## 2022-05-30 PROCEDURE — 99213 OFFICE O/P EST LOW 20 MIN: CPT | Mod: ,,, | Performed by: NURSE PRACTITIONER

## 2022-05-30 PROCEDURE — 99211 OFF/OP EST MAY X REQ PHY/QHP: CPT

## 2022-05-30 PROCEDURE — 99213 PR OFFICE/OUTPT VISIT, EST, LEVL III, 20-29 MIN: ICD-10-PCS | Mod: ,,, | Performed by: NURSE PRACTITIONER

## 2022-05-30 NOTE — PROGRESS NOTES
Chief Complaint:  Non-healing left BKA Stump Wound     History of Present Illness:  65 yo White male being seen for follow-up of post-operative left BKA stump following surgery done here @ [facility] on 7/9/2021, complicated by osteomyelitis, and dehiscence of incision line. Hospitalized 9/13/2021 - 9/16/2021 for OR debridement of significant non-healing stump wound. Discharged to [facility] on 9/16/2021 for ongoing IV antibiotics and NPWT. Discharged home 10/13/2021, with [employer]  Mr. Bone's new phone number: [phone].     Hx includes pre-diabetes, CKD2, A. fib (Eliquis), anemia, GERD, BPH, HLD, HTN, bradycardia, ETOH abuse (4 beers/day reported 5/23/2022), and obesity (BMI 31.66). During July hospitalization, he was noted to be bradycardic and underwent a Lexiscan which was negative for any ischemia or scarring. Echo performed 7/5/2021 showed reduced ejection fraction at 45 to 50% and biatrial enlargement. Never smoker.  with one grown son. Has an apartment of his own that is handicap friendly inside; however, outside is not. PCP is Dr. Giovany Duron in [place].  Current wound care to left stump: cleaning with Microcyn, followed by silver collagen, followed by gauze, kerlix, and nylon stocking to keep all dressings in place.   Prescribed Clobetasol ointment to rash around wound, as-needed.   Advanced skin substitutes: [employer] [facility] applied #1 4/7/2022, and #2 on 4/14/2022. Wound did not respond to plain collagen. Mr. Bone cannot have any kind of tape (other than paper) on skin due to severe contact allergic dermatitis. Bactrim DS prescribed 4/28/2022 Q12H X ten days.     Review of Most Recent Labs:  5/26/2022:  Pre-albumin 26.  CRP 4  4/26/2022: eGFR >60, with CR 0.9. Albumin 4.1. HgbA1C 6.0%. H&H 15.1 & 45.3.   10/30/2021: eGFR 65, with CR 1.19. HgbA1C 5.6. LDL 90. WBC 7.2. H&H 12.1 & 39.5. Plts 265. MCV 91.2. MCH 27.9.  10/24/2021: eGFR 62.0, with CR 1.24. Iron 38.  Ferritin 67.3. Folate nl. Prealbumin 21.0. B12 lvl 490. WBC 7.6. RBC 3.92. H&H 10.8 &* 35.9. Plts 254. MCV and MCH low @ 27.6.   2021: eGFR >105. WBC 8.6. RBC 2.75. H&H 7.9 & 25.7. Plts 248. MCV and MCH normal.   2021: eGFR 69, with CR 1.14. AST 37. Albumin 2.6. H&H 9.6 & 31.1. Plts 355. MCV and MCH normal.   2021: HgbA1C 5.9.     Wound Culture Results:  2022: Enterobacter cloacae complex and MSSA. Both of those are susceptible to Bactrim.  Treated with 10 days of Bactrim BID (2022)  2022: MSSA. Doxycycline began on 4/6/2022 X 5 days.    Imagin2022:  XR left knee/stump:  No evidence of osteo  2021 CT of LLE: No evidence of osteo or septic arthritis.  No lower extremity vascular studies on chart.  Left popliteal artery dopplered 2022.        Today 2022:  Primary complaint is increased swelling in left thigh and stump.  Right lower leg is also quite swollen today, which he was not aware of until comparing right and left legs together.  Noticed swelling Saturday night and  morning.  No pain in either leg.  Taking Eliquis twice a day.  Taking his Spironolactone, as prescribed.  Reports he is getting depressed sitting in his apartment and is eating saltier foods and drinking beer.  Relays mood, motivation, and activity level would be significantly increased if he was  mobile with prosthetic left leg.  Wound is fairly unchanged from last week, with initiation of silver collagen.  No reported complications with wound care or current treatment plan.  Has all wound care supplies in home.  Denies fevers, chills, odors, redness, wound pain, or yellow/green drainage.  No new rashes, lesions, or skin breakdown.  Is agreeable to being referred back to general surgery for revision evaluation of his stump, if there is no response to topical Clobetasol onto wound bed this next week.  Says he will do whatever is needed so he can be fitted for a prosthetic leg.       5/23/2022:  Discouraged that wound measurements are not smaller from last visit.  Left Iodiplex on, as instructed last visit, until home health nurse changed it on Thursday.  No reported complications with wound care or current treatment plan.  Denies fevers, chills, odors, redness, pain, or yellow/green drainage.  No new rashes, lesions, or skin breakdown.   Admitted to resumption of drinking; drinks 4 beers a day, per self-report (12 oz).     5/16/2022:  No reported complications with wound care or current treatment plan.  Has all wound care supplies in home.  Denies fevers, chills, odors, redness, pain, or yellow/green drainage.  No new rashes, lesions, or skin breakdown.  Agreeable to trying to leave today's dressing on until Thursday.  Is able to use papertape, per self-report, without any redness.    05/09/2022:  Has finished Bactrim DS since last visit.  Had to use paper tape to keep dressing in place; no recurrent redness over stump.  Denies fevers, chills, odors, redness, pain, or yellow/green drainage.  No new rashes, lesions, or skin breakdown.     4/28/2022:  Mr. Bone relays that he took last round of Doxycycline Q12H X 5 days, instead of daily X 10 days, as he relayed last week. Redness around stump all resolved today; no longer itching around wound bed. Has not applied any kind of tape to stump. Denies fevers, chills. No pain in wound. No new skin breakdown, rashes, or other skin issues.    4/21/2022:  Doxycycline, ordered on 4/6/2022, was prescribed daily, instead of the intended twice/daily frequency. He took that for ten days, daily. Had severe reaction to Versatel and steristrips from last week's application of advanced skin substitute. Skin very red over stump. He left last week's dressing in place, as instructed.     4/14/2022:  Has finished Doxycycline, without any new s/e. Had some difficulty keeping kerlix in place last week, and can only use paper tape. He secured dressing with  additional gauze and applied an ACE wrap. Denies fevers, chills, redness, pain, odors, or strike-through drainage. No new skin breakdown, rashes, or other skin issues.    4/7/2020:  Picked up Doxycycline yesterday and has taken two doses; no new s/e with that. Mr. Bone agrees to move forward with an advanced skin substitute today; agrees to leave today's bandage on for one week, until he comes back to clinic. Experienced a reaction to a tape over past week; has a lot of redness around wound. No fevers, chills, odors, or yellow/green drainage. No new skin breakdown, rashes, or other skin issues.    4/1/2022:  States he is able to come to clinic weekly for me to manage his non-healing wound more aggressively. He is agreeable to a trial of a sample advanced skin substitute. Home health continues seeing him; nurse comes Monday. Denies fevers, chills, increased redness, pain, odors, or yellow/green drainage. No new skin breakdown, rashes, or other skin issues. Itching in stump is primarily up over thigh where tubigrip end lays.     3/4/2022:  Wound along middle area of stump has healed. Wound along lateral stump is healing in an abnormal shape. Touches base with [facility] monthly regarding prosthetic leg. No new complaints with wound or current wound care regimen. Has all wound care supplies in home. Home health continues seeing him once week. Denies fevers, chills, increased redness, increased pain, odors, or yellow/green drainage. No new skin breakdown, rashes, or other skin issues. Still wants to be seen on monthly basis.       Review of Systems:   Except as stated in HPI, all other 10 body systems normal      Patient Active Problem List   Diagnosis    Status post below-knee amputation of left lower extremity    Non-healing surgical wound    Complication of amputation stump of left lower extremity    Pre-diabetes    Contact dermatitis due to adhesive bandage    Iron deficiency anemia    Hypoproteinemia     Wheelchair confinement status    History of osteomyelitis    Atrial fibrillation    Chronic anemia    Alcohol abuse    Generalized edema         Current Outpatient Medications on File Prior to Encounter   Medication Sig Dispense Refill    apixaban (ELIQUIS) 2.5 mg Tab Take 5 mg by mouth once daily at 6am.      gabapentin (NEURONTIN) 300 MG capsule Take 300 mg by mouth 2 (two) times daily.      hydrALAZINE (APRESOLINE) 25 MG tablet Take 25 mg by mouth once daily at 6am.      iron 18 mg Tab 65 mg      potassium chloride SA (K-DUR,KLOR-CON) 20 MEQ tablet Take 20 mEq by mouth once daily at 6am.      spironolactone (ALDACTONE) 50 MG tablet Take 50 mg by mouth once daily at 6am.       No current facility-administered medications on file prior to encounter.         Physical Exam Vitals & Measurements:   Vitals:    05/30/22 0749   BP: 139/78   Pulse: 74   Temp: 98.1 °F (36.7 °C)         General:  VSS; afebrile. Well-nourished; in no acute distress.   Respiratory: breathing even, quiet, and unlabored. No coughing.   Cardiovascular:  Significant stasis dermatitis over right anterior tibia, with moderate non-pitting brawny edema to RLE and LLE BKA stump.  Edema noted over left knee joint. Scattered hair distribution over BLE, including left BKA stump.  Left popliteal pulse dopplered 5/30/2022.   Musculoskeletal: left BKA; arrived via wheelchair; full range of motion of all extremities  Neurologic: A&O X 3; cranial nerves grossly intact  Psychiatric: Calm, cooperative. Responses appropriate.  Mr. Bone became tearful during visit, when discussing diet and beer intake.    Integumentary:  Laterally located full thickness wound:  100% pink smooth tissue.  No periwound erythema, purulent drainage, or odors. Wound margins without induration, bogginess, or fluctuance.  Wound dimensions unchanged from last visit.           Wound 05/09/22 0931 Venous Ulcer Left lateral Leg #1 (Active)   05/09/22 0931    Pre-existing:     Primary Wound Type: Venous ulcer   Side: Left   Orientation: lateral   Location: Leg   Wound Number: #1   Ankle-Brachial Index:    Pulses:    Removal Indication and Assessment:    Wound Outcome:    (Retired) Wound Type:    (Retired) Wound Length (cm):    (Retired) Wound Width (cm):    (Retired) Depth (cm):    Wound Description (Comments):    Removal Indications:    Dressing Appearance Clean 05/30/22 0750   Drainage Amount Small 05/30/22 0750   Tissue loss description Full thickness 05/30/22 0750   Wound Length (cm) 1 cm 05/30/22 0750   Wound Width (cm) 2 cm 05/30/22 0750   Wound Depth (cm) 0.1 cm 05/30/22 0750   Wound Volume (cm^3) 0.2 cm^3 05/30/22 0750   Wound Surface Area (cm^2) 2 cm^2 05/30/22 0750   Care Cleansed with:;Antimicrobial agent 05/30/22 0750   Dressing Removed 05/30/22 0750               Assessment/Plan:    Disruption external operation, subsequent visit (BKA due to osteomyelitis):  S/P left BKA 7/9/2021 with final closure on 7/12/2021.   S/P debridement in OR here @ [facility] on 9/16/2021, with [facility] stay [facility], where he received NPWT and IV antibiotics.   [facility] is following for left leg prosthetic leg.  Will be fitted for that, once wound has healed.    Mr. Bone is exhibiting adjustment disorder with depressed mood and impaired conduct, as a result of not being able to be mobile like he was prior to amputation.  Mood disorder is causing him to resume alcohol intake, per self-report 5/30/2022.    5/30/2022:  I discussed revision evaluation with general surgery, as his wound has not responded to all topical wound care interventions, including tx of wound infection.  Unable to apply advanced skin substitutes due to his severe allergic dermatitis related to all tapes, except paper.  Experienced severe reaction to Versatel, which was used to secure advanced skin sub in past.     Open wound to left BKA (originally) to fat layer:  Wound healing stalled, despite use of multiple products,  including advanced skin substitutes.  Wound infection was treated, without subsequent wound healing.  Left popliteal pulse dopplered 5/30/2022.  5/23/2022 XR of left knee/stump did not show osteomyelitis.    5/26/2022 labs:  Pre-albumin 26.  CRP 4  5/17/2022:  Mr. Bone relays that he was sent to a nursing home when he was discharged from [facility] after amputation due to insurance coverage.  He did not have skilled nursing at that facility.  Mr. Bone showed me a photo of wounds before September 2020 OR debridement.  Wound was very large and infected.    Wound Care Today/New Wound Care Treatment Plan:  Clean wound with wound cleanser and pat dry.  Apply small amount of Calmoseptine around wound edges to prevent maceration.   Apply Clobetasol 0.05% to small piece of gauze and apply to wound bed, followed by gauze squares, followed by kerlix, followed by single-layer Coban to secure dressings.  May use paper tape.    Wound care to be done twice/day.   PLAN:  If wound does not respond to topical Clobetasol, I will refer Mr. Bone back to [facility] General Surgery for revision of stump evaluation.  Mr. Bone was agreeable with that recommendation.      History of Osteomyelitis:  5/23/2022 XR of left knee/stump did not show osteomyelitis.    5/26/2022 labs:  CRP 4  Treated with 6 weeks of IV antibiotics at [facility] back in October 2021.      Adjustment Disorder with Depressed Mood and Impaired Conduct:  Impaired mobility is causing s/s of depression, AEB tearfulness in visit today.  Self-reported increased eating and drinking beer because he has been unable to ambulate.  Having a lot of anxiety about not being able to be fitted with prosthetic leg.     ETOH abuse:  5/23/2022:  Self-reported intake of 4 beers/day.   Normal recent pre-albumin level of 26.  Thiamine level not available today.  [facility] closed for [facility] Day.    Discussed role ETOH abuse plays in delayed wound healing, as well as peripheral edema with  liver disease.  Cessation encouraged.      Edema in BLE:  Right leg edematous today, as well.  Significant stasis dermatitis in RLE.  Was able to doppler left popliteal pulse.  He is taking Eliquis twice a day, as prescribed.  Stressed role liver disease plays in peripheral edema and overall health status.  Mr. Bone indicated he felt he would not be drinking as much, if he was able to get out of his apartment, and walk, with a prosthetic leg.   Teaching provided on worsening s/s that he needs to come to ER for.      Contact Allergic Dermatitis:  Resolved with avoiding all tapes except for paper tape.  Prescribed Clobetasol 0.05% topical ointment twice/daily for any recurrent erythema and itching.   Mr. Bone cannot tolerate tapes other than paper tape, Versatel, steristrips, or adhesive bandages.   Unable to resume advanced skin substitutes due to severe allergic contact dermatitis with steristrips and Versatel to secure skin substitutes.      Left BKA wound Infection:  Treated on 4/28/2022.    Wound Culture Results:  4/21/2022: Enterobacter cloacae complex and MSSA.   Treated with Bactrim DS 1 tab Q12H for ten days (4/28/2022).      Anemia:  This has been identified as a co-factor towards impaired wound healing.   4/26/2022: H&H 15.1 & 45.3.   10/30/2021: H&H 12.1 & 39.5. Plts 265. MCV 91.2. MCH 27.9.  10/24/2021: Iron 38. Ferritin 67.3. Folate nl. Prealbumin 21.0. B12 lvl 490. RBC 3.92. H&H 10.8 & 35.9. Plts 254. MCV and MCH low @ 27.6.   9/16/2021: RBC 2.75. H&H 7.9 & 25.7. Plts 248. MCV and MCH normal.   Prescribed ferrous sulfate 324 mg, with Vitamin C 500 mg daily. Handout given on foods higher in iron during previous visit.     Hypoproteinemia:  Resolved.   5/26/2022:  Pre-albumin 26.   4/26/2022: Albumin 4.1.    10/24/2021: pre-albumin 21.0.  7/21/2021: Albumin 2.6.   Prescribed 2-week course of Matt on 11/5/21.   Handout given on foods higher in protein to include/increase in diet during previous  wound clinic visit.    Pre-diabetes with peripheral neuropathy:  4/26/2022: HgbA1C 6.0%.   10/30/2021: eGFR 65, with CR 1.19. HgbA1C 5.6. LDL 90.   7/2/2021: HgbA1C 5.9. Not prescribed any antidiabetic medications.   Being managed by PCP.    Wheelchair confined status:  This has been identified as a co-factor towards impaired wound healing.   Has not had any kind of contact with orthotist for prosthetic leg fitting. Will address this later, as wound progresses toward healing phase.             RTC in one week.  Instructed on s/s of deterioration to call wound clinic for promptly in between clinic visits so we can schedule him that day, or the next day.   Copy of today's visit note faxed to [employer].

## 2022-06-06 ENCOUNTER — HOSPITAL ENCOUNTER (OUTPATIENT)
Dept: WOUND CARE | Facility: HOSPITAL | Age: 67
Discharge: HOME | End: 2022-06-06
Attending: NURSE PRACTITIONER
Payer: MEDICARE

## 2022-06-06 VITALS
RESPIRATION RATE: 18 BRPM | TEMPERATURE: 99 F | DIASTOLIC BLOOD PRESSURE: 64 MMHG | HEART RATE: 64 BPM | SYSTOLIC BLOOD PRESSURE: 165 MMHG

## 2022-06-06 DIAGNOSIS — F10.10 ALCOHOL ABUSE: ICD-10-CM

## 2022-06-06 DIAGNOSIS — T87.9 COMPLICATION OF AMPUTATION STUMP OF LEFT LOWER EXTREMITY: ICD-10-CM

## 2022-06-06 DIAGNOSIS — Z87.39 HISTORY OF OSTEOMYELITIS: ICD-10-CM

## 2022-06-06 DIAGNOSIS — D50.8 OTHER IRON DEFICIENCY ANEMIA: ICD-10-CM

## 2022-06-06 DIAGNOSIS — R60.1 GENERALIZED EDEMA: ICD-10-CM

## 2022-06-06 DIAGNOSIS — T81.89XD NON-HEALING SURGICAL WOUND, SUBSEQUENT ENCOUNTER: ICD-10-CM

## 2022-06-06 DIAGNOSIS — F43.25 ADJUSTMENT DISORDER WITH MIXED DISTURBANCE OF EMOTIONS AND CONDUCT: ICD-10-CM

## 2022-06-06 DIAGNOSIS — Z89.512 STATUS POST BELOW-KNEE AMPUTATION OF LEFT LOWER EXTREMITY: Primary | ICD-10-CM

## 2022-06-06 DIAGNOSIS — Z99.3 WHEELCHAIR CONFINEMENT STATUS: ICD-10-CM

## 2022-06-06 DIAGNOSIS — R73.03 PRE-DIABETES: ICD-10-CM

## 2022-06-06 PROCEDURE — 99213 PR OFFICE/OUTPT VISIT, EST, LEVL III, 20-29 MIN: ICD-10-PCS | Mod: ,,, | Performed by: NURSE PRACTITIONER

## 2022-06-06 PROCEDURE — 99211 OFF/OP EST MAY X REQ PHY/QHP: CPT

## 2022-06-06 PROCEDURE — 99213 OFFICE O/P EST LOW 20 MIN: CPT | Mod: ,,, | Performed by: NURSE PRACTITIONER

## 2022-06-06 RX ORDER — CLOBETASOL PROPIONATE 0.5 MG/G
1 OINTMENT TOPICAL 2 TIMES DAILY
COMMUNITY
End: 2022-06-13 | Stop reason: SDUPTHER

## 2022-06-06 NOTE — PATIENT INSTRUCTIONS
Continue same wound care: cleanse with wound cleanser and apply steroid cream twice daily. Cover with dry dressing.  Infection precautions

## 2022-06-06 NOTE — PROGRESS NOTES
Chief Complaint:  Non-healing left BKA Stump Wound     History of Present Illness:  67 yo White male being seen for follow-up of post-operative left BKA stump following surgery done here @ [facility] on 7/9/2021, complicated by osteomyelitis, and dehiscence of incision line. Hospitalized 9/13/2021 - 9/16/2021 for OR debridement of significant non-healing stump wound. Discharged to [facility] on 9/16/2021 for ongoing IV antibiotics and NPWT. Discharged home 10/13/2021, with [employer] [facility]).  Mr. Bone's new phone number: [phone].     Hx includes pre-diabetes, CKD2, A. fib (Eliquis), anemia, GERD, BPH, HLD, HTN, bradycardia, ETOH abuse (4 beers/day reported 5/23/2022), and obesity (BMI 31.66). During July hospitalization, he was noted to be bradycardic and underwent a Lexiscan which was negative for any ischemia or scarring. Echo performed 7/5/2021 showed reduced ejection fraction at 45 to 50% and biatrial enlargement. Never smoker.  with one grown son. Has an apartment of his own that is handicap friendly inside; however, outside is not. PCP is Dr. Giovany Duron in [place].  Current wound care to left stump: cleaning with Dakins 0.125%, followed by Calmoseptine around periwound margins, followed by Clobetasol 0.05%, followed by by gauze, kerlix, and nylon stocking to keep all dressings in place.    Advanced skin substitutes: [employer] NuShield applied #1 4/7/2022, and #2 on 4/14/2022. Wound did not respond to plain collagen. Mr. Bone cannot have any kind of tape (other than paper) on skin due to severe contact allergic dermatitis. Bactrim DS prescribed 4/28/2022 Q12H X ten days.     Review of Most Recent Labs:  5/26/2022:  Pre-albumin 26.  CRP 4  4/26/2022: eGFR >60, with CR 0.9. Albumin 4.1. HgbA1C 6.0%. H&H 15.1 & 45.3.   10/30/2021: eGFR 65, with CR 1.19. HgbA1C 5.6. LDL 90. WBC 7.2. H&H 12.1 & 39.5. Plts 265. MCV 91.2. MCH 27.9.  10/24/2021: eGFR 62.0, with CR 1.24. Iron 38.  Ferritin 67.3. Folate nl. Prealbumin 21.0. B12 lvl 490. WBC 7.6. RBC 3.92. H&H 10.8 &* 35.9. Plts 254. MCV and MCH low @ 27.6.   2021: eGFR >105. WBC 8.6. RBC 2.75. H&H 7.9 & 25.7. Plts 248. MCV and MCH normal.   2021: eGFR 69, with CR 1.14. AST 37. Albumin 2.6. H&H 9.6 & 31.1. Plts 355. MCV and MCH normal.   2021: HgbA1C 5.9.     Wound Culture Results:  2022: Enterobacter cloacae complex and MSSA. Both of those are susceptible to Bactrim.  Treated with 10 days of Bactrim BID (2022)  2022: MSSA. Doxycycline began on 4/6/2022 X 5 days.    Imagin2022:  XR left knee/stump:  No evidence of osteo  2021 CT of LLE: No evidence of osteo or septic arthritis.  No lower extremity vascular studies on chart.  Left popliteal artery dopplered 2022.        Today 2022:  Swelling has resolved over left BKA stump, as well as RLE, with changes in diet to avoid salt.  He is drinking lime water.  No reported complications with wound care or current treatment plan.  Has all wound care supplies in home.  Denies fevers, chills, wound odor, wound redness, wound pain, or yellow/green drainage.  No new rashes, lesions, or skin breakdown.  Did not take morning medications this morning.     2022:  Primary complaint is increased swelling in left thigh and stump.  Right lower leg is also quite swollen today, which he was not aware of until comparing right and left legs together.  Noticed swelling Saturday night and  morning.  No pain in either leg.  Taking Eliquis twice a day.  Taking his Spironolactone, as prescribed.  Reports he is getting depressed sitting in his apartment and is eating saltier foods and drinking beer.  Relays mood, motivation, and activity level would be significantly increased if he was  mobile with prosthetic left leg.  Wound is fairly unchanged from last week, with initiation of silver collagen.  No reported complications with wound care or current treatment  plan.  Has all wound care supplies in home.  Denies fevers, chills, odors, redness, wound pain, or yellow/green drainage.  No new rashes, lesions, or skin breakdown.  Is agreeable to being referred back to general surgery for revision evaluation of his stump, if there is no response to topical Clobetasol onto wound bed this next week.  Says he will do whatever is needed so he can be fitted for a prosthetic leg.      5/23/2022:  Discouraged that wound measurements are not smaller from last visit.  Left Iodiplex on, as instructed last visit, until home health nurse changed it on Thursday.  No reported complications with wound care or current treatment plan.  Denies fevers, chills, odors, redness, pain, or yellow/green drainage.  No new rashes, lesions, or skin breakdown.   Admitted to resumption of drinking; drinks 4 beers a day, per self-report (12 oz).     5/16/2022:  No reported complications with wound care or current treatment plan.  Has all wound care supplies in home.  Denies fevers, chills, odors, redness, pain, or yellow/green drainage.  No new rashes, lesions, or skin breakdown.  Agreeable to trying to leave today's dressing on until Thursday.  Is able to use papertape, per self-report, without any redness.    05/09/2022:  Has finished Bactrim DS since last visit.  Had to use paper tape to keep dressing in place; no recurrent redness over stump.  Denies fevers, chills, odors, redness, pain, or yellow/green drainage.  No new rashes, lesions, or skin breakdown.     4/28/2022:  Mr. Bone relays that he took last round of Doxycycline Q12H X 5 days, instead of daily X 10 days, as he relayed last week. Redness around stump all resolved today; no longer itching around wound bed. Has not applied any kind of tape to stump. Denies fevers, chills. No pain in wound. No new skin breakdown, rashes, or other skin issues.    4/21/2022:  Doxycycline, ordered on 4/6/2022, was prescribed daily, instead of the intended  twice/daily frequency. He took that for ten days, daily. Had severe reaction to Versatel and steristrips from last week's application of advanced skin substitute. Skin very red over stump. He left last week's dressing in place, as instructed.     4/14/2022:  Has finished Doxycycline, without any new s/e. Had some difficulty keeping kerlix in place last week, and can only use paper tape. He secured dressing with additional gauze and applied an ACE wrap. Denies fevers, chills, redness, pain, odors, or strike-through drainage. No new skin breakdown, rashes, or other skin issues.    4/7/2020:  Picked up Doxycycline yesterday and has taken two doses; no new s/e with that. Mr. Bone agrees to move forward with an advanced skin substitute today; agrees to leave today's bandage on for one week, until he comes back to clinic. Experienced a reaction to a tape over past week; has a lot of redness around wound. No fevers, chills, odors, or yellow/green drainage. No new skin breakdown, rashes, or other skin issues.    4/1/2022:  States he is able to come to clinic weekly for me to manage his non-healing wound more aggressively. He is agreeable to a trial of a sample advanced skin substitute. Home health continues seeing him; nurse comes Monday. Denies fevers, chills, increased redness, pain, odors, or yellow/green drainage. No new skin breakdown, rashes, or other skin issues. Itching in stump is primarily up over thigh where tubigrip end lays.     3/4/2022:  Wound along middle area of stump has healed. Wound along lateral stump is healing in an abnormal shape. Touches base with [facility] monthly regarding prosthetic leg. No new complaints with wound or current wound care regimen. Has all wound care supplies in home. Home health continues seeing him once week. Denies fevers, chills, increased redness, increased pain, odors, or yellow/green drainage. No new skin breakdown, rashes, or other skin issues. Still wants to be  seen on monthly basis.       Review of Systems:   Except as stated in HPI, all other 10 body systems normal      Patient Active Problem List   Diagnosis    Status post below-knee amputation of left lower extremity    Non-healing surgical wound    Complication of amputation stump of left lower extremity    Pre-diabetes    Contact dermatitis due to adhesive bandage    Iron deficiency anemia    Hypoproteinemia    Wheelchair confinement status    History of osteomyelitis    Atrial fibrillation    Chronic anemia    Alcohol abuse    Generalized edema    Adjustment disorder with mixed disturbance of emotions and conduct         Current Outpatient Medications on File Prior to Encounter   Medication Sig Dispense Refill    apixaban (ELIQUIS) 2.5 mg Tab Take 5 mg by mouth once daily at 6am.      gabapentin (NEURONTIN) 300 MG capsule Take 300 mg by mouth 2 (two) times daily.      hydrALAZINE (APRESOLINE) 25 MG tablet Take 25 mg by mouth once daily at 6am.      iron 18 mg Tab 65 mg      potassium chloride SA (K-DUR,KLOR-CON) 20 MEQ tablet Take 20 mEq by mouth once daily at 6am.      spironolactone (ALDACTONE) 50 MG tablet Take 50 mg by mouth once daily at 6am.       No current facility-administered medications on file prior to encounter.         Physical Exam Vitals & Measurements:   Vitals:    06/06/22 0804   BP: (!) 165/64   Pulse: 64   Resp: 18   Temp: 98.6 °F (37 °C)         General:  Hypertensive, asymptomatic with; afebrile. Well-nourished; in no acute distress.   Respiratory: breathing even, quiet, and unlabored. No coughing.   Cardiovascular:  Significant stasis dermatitis over right anterior tibia, with moderate non-pitting brawny edema to RLE.   LLE BKA stump edema grossly improved from last week; skin soft and supple.  Scattered hair distribution over BLE, including left BKA stump.  Left popliteal pulse dopplered 5/30/2022.   Musculoskeletal: left BKA; arrived via wheelchair; full range of motion  of all extremities  Neurologic: A&O X 3; cranial nerves grossly intact  Psychiatric: Calm, cooperative. Responses appropriate.    Integumentary:  Laterally located full thickness wound:  100% pink smooth tissue.  Mild periwound erythema; no purulent drainage, or odors. Wound margins without induration, bogginess, or fluctuance.  Wound dimensions significantly smaller from last visit.           Wound 05/09/22 0931 Venous Ulcer Left lateral Leg #1 (Active)   05/09/22 0931    Pre-existing:    Primary Wound Type: Venous ulcer   Side: Left   Orientation: lateral   Location: Leg   Wound Number: #1   Ankle-Brachial Index:    Pulses:    Removal Indication and Assessment:    Wound Outcome:    (Retired) Wound Type:    (Retired) Wound Length (cm):    (Retired) Wound Width (cm):    (Retired) Depth (cm):    Wound Description (Comments):    Removal Indications:    Wound Length (cm) 0.7 cm 06/06/22 0805   Wound Width (cm) 1.3 cm 06/06/22 0805   Wound Depth (cm) 0.1 cm 06/06/22 0805   Wound Volume (cm^3) 0.091 cm^3 06/06/22 0805   Wound Surface Area (cm^2) 0.91 cm^2 06/06/22 0805                   Assessment/Plan:    Disruption external operation, subsequent visit (BKA due to osteomyelitis):  S/P left BKA 7/9/2021 with final closure on 7/12/2021.   S/P debridement in OR here @ [facility] on 9/16/2021, with [facility], where he received NPWT and IV antibiotics.   [facility] is following for left leg prosthetic leg.  Will be fitted for that, once wound has healed.    Mr. Bone is exhibiting adjustment disorder with depressed mood and impaired conduct, as a result of not being able to be mobile like he was prior to amputation.  Mood disorder is causing him to resume alcohol intake, per self-report 5/30/2022.    5/30/2022:  I discussed revision evaluation with general surgery, as his wound has not responded to all topical wound care interventions, including tx of wound infection.  Unable to apply advanced skin substitutes  due to his severe allergic dermatitis related to all tapes, except paper.  Experienced severe reaction to Versatel, which was used to secure advanced skin sub in past.     Open wound to left BKA (originally) to fat layer:  Wound has healed in significantly with Clobetasol 0.05% ointment directly onto, and around, wound bed.  Given all of the severe allergies Mr. Bone has had to various tapes and topical products in past, we have chosen to treat ulcer as having an underlying inflammatory nature.    Wound healing had previously stalled, despite use of multiple products, including advanced skin substitutes.  Wound infection was treated, without subsequent wound healing.  Left popliteal pulse dopplered 5/30/2022.  5/23/2022 XR of left knee/stump did not show osteomyelitis.    5/26/2022 labs:  Pre-albumin 26.  CRP 4  5/17/2022:  Mr. Bone relays that he was sent to a nursing home when he was discharged from [facility] after amputation due to insurance coverage.  He did not have skilled nursing at that facility.  Mr. Bone showed me a photo of wounds before September 2020 OR debridement.  Wound was very large and infected.    Wound Care Today/Continued Wound Care Treatment Plan:  Clean wound with Dakins 0.125% wound and pat dry.  Apply small amount of Calmoseptine around wound edges to prevent maceration.   Apply Clobetasol 0.05% to small piece of gauze and apply to wound bed, followed by gauze squares, followed by kerlix, followed by single-layer Coban to secure dressings.  May use paper tape.    Wound care to be done twice/day.   Will hold off on general surgery referral, as wound has responded very well to topical Clobetasol.      History of Osteomyelitis:  5/23/2022 XR of left knee/stump did not show osteomyelitis.    5/26/2022 labs:  CRP 4  Treated with 6 weeks of IV antibiotics at [facility] back in October 2021.      Adjustment Disorder with Depressed Mood and Impaired Conduct:  Impaired mobility is causing s/s of  depression, AEB tearfulness during past clinic visits.  Self-reported increased eating and drinking beer because he has been unable to ambulate.  Having a lot of anxiety about not being able to be fitted with prosthetic leg.     ETOH abuse:  5/23/2022:  Self-reported intake of 4 beers/day.   Normal recent pre-albumin level of 26.  I have counseled Mr. Bone on long-term risks associated with daily ETOH intake, including delayed wound healing.  Cessation encouraged.      Edema in BLE:  Much improved today with dietary and beverage changes.      Contact Allergic Dermatitis:  Resolved with avoiding all tapes except for paper tape.  Prescribed Clobetasol 0.05% topical ointment twice/daily for any recurrent erythema and itching.   Mr. Bone cannot tolerate tapes other than paper tape.  Unable to tolerate Versatel, steristrips, or adhesive bandages.   Unable to resume advanced skin substitutes due to severe allergic contact dermatitis with steristrips and Versatel to secure skin substitutes.      Left BKA wound Infection:  Treated on 4/28/2022.    Wound Culture Results:  4/21/2022: Enterobacter cloacae complex and MSSA.   Treated with Bactrim DS 1 tab Q12H for ten days (4/28/2022).      Anemia:  This has been identified as a co-factor towards impaired wound healing.   4/26/2022: H&H 15.1 & 45.3.   10/30/2021: H&H 12.1 & 39.5. Plts 265. MCV 91.2. MCH 27.9.  10/24/2021: Iron 38. Ferritin 67.3. Folate nl. Prealbumin 21.0. B12 lvl 490. RBC 3.92. H&H 10.8 & 35.9. Plts 254. MCV and MCH low @ 27.6.   9/16/2021: RBC 2.75. H&H 7.9 & 25.7. Plts 248. MCV and MCH normal.   Prescribed ferrous sulfate 324 mg, with Vitamin C 500 mg daily. Handout given on foods higher in iron during previous visit.     Hypoproteinemia:  Resolved.   5/26/2022:  Pre-albumin 26.   4/26/2022: Albumin 4.1.    10/24/2021: pre-albumin 21.0.  7/21/2021: Albumin 2.6.   Prescribed 2-week course of Matt on 11/5/21.   Handout given on foods higher in protein  to include/increase in diet during previous wound clinic visit.    Pre-diabetes with peripheral neuropathy:  4/26/2022: HgbA1C 6.0%.   10/30/2021: eGFR 65, with CR 1.19. HgbA1C 5.6. LDL 90.   7/2/2021: HgbA1C 5.9. Not prescribed any antidiabetic medications.   Being managed by PCP.    Wheelchair confined status:  This has been identified as a co-factor towards impaired wound healing.   [facility] is following for BKA prosthetic fitting, once wound has healed.              RTC in one week.  Instructed on s/s of deterioration to call wound clinic for promptly in between clinic visits so we can schedule him that day, or the next day.

## 2022-06-07 DIAGNOSIS — L28.2 OTHER PRURIGO: ICD-10-CM

## 2022-06-07 RX ORDER — TRIAMCINOLONE ACETONIDE 5 MG/G
CREAM TOPICAL
Qty: 15 G | Refills: 3 | Status: SHIPPED | OUTPATIENT
Start: 2022-06-07

## 2022-06-13 ENCOUNTER — HOSPITAL ENCOUNTER (OUTPATIENT)
Dept: WOUND CARE | Facility: HOSPITAL | Age: 67
Discharge: HOME | End: 2022-06-13
Attending: NURSE PRACTITIONER
Payer: MEDICARE

## 2022-06-13 VITALS — TEMPERATURE: 98 F | DIASTOLIC BLOOD PRESSURE: 87 MMHG | HEART RATE: 67 BPM | SYSTOLIC BLOOD PRESSURE: 153 MMHG

## 2022-06-13 DIAGNOSIS — Z99.3 WHEELCHAIR CONFINEMENT STATUS: ICD-10-CM

## 2022-06-13 DIAGNOSIS — D50.8 OTHER IRON DEFICIENCY ANEMIA: ICD-10-CM

## 2022-06-13 DIAGNOSIS — L23.1 CONTACT DERMATITIS DUE TO ADHESIVE BANDAGE: ICD-10-CM

## 2022-06-13 DIAGNOSIS — Z87.39 HISTORY OF OSTEOMYELITIS: ICD-10-CM

## 2022-06-13 DIAGNOSIS — Z89.512 STATUS POST BELOW-KNEE AMPUTATION OF LEFT LOWER EXTREMITY: ICD-10-CM

## 2022-06-13 DIAGNOSIS — F10.10 ALCOHOL ABUSE: ICD-10-CM

## 2022-06-13 DIAGNOSIS — T81.89XD NON-HEALING SURGICAL WOUND, SUBSEQUENT ENCOUNTER: ICD-10-CM

## 2022-06-13 DIAGNOSIS — L97.922 NON-PRESSURE ULCER OF LEFT LOWER EXTREMITY WITH FAT LAYER EXPOSED: Primary | ICD-10-CM

## 2022-06-13 DIAGNOSIS — T87.9 COMPLICATION OF AMPUTATION STUMP OF LEFT LOWER EXTREMITY: ICD-10-CM

## 2022-06-13 DIAGNOSIS — R73.03 PRE-DIABETES: ICD-10-CM

## 2022-06-13 DIAGNOSIS — F43.25 ADJUSTMENT DISORDER WITH MIXED DISTURBANCE OF EMOTIONS AND CONDUCT: ICD-10-CM

## 2022-06-13 PROCEDURE — 99213 OFFICE O/P EST LOW 20 MIN: CPT | Mod: ,,, | Performed by: NURSE PRACTITIONER

## 2022-06-13 PROCEDURE — 99213 PR OFFICE/OUTPT VISIT, EST, LEVL III, 20-29 MIN: ICD-10-PCS | Mod: ,,, | Performed by: NURSE PRACTITIONER

## 2022-06-13 PROCEDURE — 99211 OFF/OP EST MAY X REQ PHY/QHP: CPT

## 2022-06-13 RX ORDER — CLOBETASOL PROPIONATE 0.5 MG/G
1 OINTMENT TOPICAL 2 TIMES DAILY
Qty: 1 EACH | Refills: 3 | Status: SHIPPED | OUTPATIENT
Start: 2022-06-13

## 2022-06-13 NOTE — PROGRESS NOTES
Chief Complaint:  Non-healing left BKA Stump Wound     History of Present Illness:  67 yo White male being seen for follow-up of post-operative left BKA stump following surgery done here @ [facility] on 7/9/2021, complicated by osteomyelitis, and dehiscence of incision line. Hospitalized 9/13/2021 - 9/16/2021 for OR debridement of significant non-healing stump wound. Discharged to [facility] on 9/16/2021 for ongoing IV antibiotics and NPWT. Discharged home 10/13/2021, with [employer]  Mr. Bone's new phone number: [phone].     Hx includes pre-diabetes, CKD2, A. fib (Eliquis), anemia, GERD, BPH, HLD, HTN, bradycardia, ETOH abuse (4 beers/day reported 5/23/2022), and obesity (BMI 31.66). During July hospitalization, he was noted to be bradycardic and underwent a Lexiscan which was negative for any ischemia or scarring. Echo performed 7/5/2021 showed reduced ejection fraction at 45 to 50% and biatrial enlargement. Never smoker.  with one grown son. Has an apartment of his own that is handicap friendly inside; however, outside is not. PCP is Dr. Giovany Duron in [place].  Current wound care to left stump: cleaning with Dakins 0.125%, followed by Calmoseptine around periwound margins, followed by Clobetasol 0.05%, followed by by gauze, kerlix, and nylon stocking to keep all dressings in place.    Wound care being done twice/dailyy.   Advanced skin substitutes: [employer] NuShield applied #1 4/7/2022, and #2 on 4/14/2022. Wound did not respond to plain collagen. Mr. Bone cannot have any kind of tape (other than paper) on skin due to severe contact allergic dermatitis. Bactrim DS prescribed 4/28/2022 Q12H X ten days.     Review of Most Recent Labs:  5/26/2022:  Pre-albumin 26.  CRP 4  4/26/2022: eGFR >60, with CR 0.9. Albumin 4.1. HgbA1C 6.0%. H&H 15.1 & 45.3.   10/30/2021: eGFR 65, with CR 1.19. HgbA1C 5.6. LDL 90. WBC 7.2. H&H 12.1 & 39.5. Plts 265. MCV 91.2. MCH 27.9.  10/24/2021:  eGFR 62.0, with CR 1.24. Iron 38. Ferritin 67.3. Folate nl. Prealbumin 21.0. B12 lvl 490. WBC 7.6. RBC 3.92. H&H 10.8 &* 35.9. Plts 254. MCV and MCH low @ 27.6.   2021: eGFR >105. WBC 8.6. RBC 2.75. H&H 7.9 & 25.7. Plts 248. MCV and MCH normal.   2021: eGFR 69, with CR 1.14. AST 37. Albumin 2.6. H&H 9.6 & 31.1. Plts 355. MCV and MCH normal.   2021: HgbA1C 5.9.     Wound Culture Results:  2022: Enterobacter cloacae complex and MSSA. Both of those are susceptible to Bactrim.  Treated with 10 days of Bactrim BID (2022)  2022: MSSA. Doxycycline began on 4/6/2022 X 5 days.    Imagin2022:  XR left knee/stump:  No evidence of osteo  2021 CT of LLE: No evidence of osteo or septic arthritis.  No lower extremity vascular studies on chart.  Left popliteal artery dopplered 2022.        Today 2022:  No reported complications with wound care or current treatment plan.  Has all wound care supplies in home.  Needs refill on Clobetasol.  Drinking lime water in an effort to control swelling in legs.  Asking if pickle juice is a good choice for hydration.  Denies fevers, chills, wound odor, wound redness, wound pain, or yellow/green drainage.  No new rashes, lesions, or skin breakdown.  [facility] nurse sees him weekly.     2022:  Swelling has resolved over left BKA stump, as well as RLE, with changes in diet to avoid salt.  He is drinking lime water.  No reported complications with wound care or current treatment plan.  Has all wound care supplies in home.  Denies fevers, chills, wound odor, wound redness, wound pain, or yellow/green drainage.  No new rashes, lesions, or skin breakdown.  Did not take morning medications this morning.     2022:  Primary complaint is increased swelling in left thigh and stump.  Right lower leg is also quite swollen today, which he was not aware of until comparing right and left legs together.  Noticed swelling Saturday night and  Sunday morning.  No pain in either leg.  Taking Eliquis twice a day.  Taking his Spironolactone, as prescribed.  Reports he is getting depressed sitting in his apartment and is eating saltier foods and drinking beer.  Relays mood, motivation, and activity level would be significantly increased if he was  mobile with prosthetic left leg.  Wound is fairly unchanged from last week, with initiation of silver collagen.  No reported complications with wound care or current treatment plan.  Has all wound care supplies in home.  Denies fevers, chills, odors, redness, wound pain, or yellow/green drainage.  No new rashes, lesions, or skin breakdown.  Is agreeable to being referred back to general surgery for revision evaluation of his stump, if there is no response to topical Clobetasol onto wound bed this next week.  Says he will do whatever is needed so he can be fitted for a prosthetic leg.      5/23/2022:  Discouraged that wound measurements are not smaller from last visit.  Left Iodiplex on, as instructed last visit, until home health nurse changed it on Thursday.  No reported complications with wound care or current treatment plan.  Denies fevers, chills, odors, redness, pain, or yellow/green drainage.  No new rashes, lesions, or skin breakdown.   Admitted to resumption of drinking; drinks 4 beers a day, per self-report (12 oz).     5/16/2022:  No reported complications with wound care or current treatment plan.  Has all wound care supplies in home.  Denies fevers, chills, odors, redness, pain, or yellow/green drainage.  No new rashes, lesions, or skin breakdown.  Agreeable to trying to leave today's dressing on until Thursday.  Is able to use papertape, per self-report, without any redness.    05/09/2022:  Has finished Bactrim DS since last visit.  Had to use paper tape to keep dressing in place; no recurrent redness over stump.  Denies fevers, chills, odors, redness, pain, or yellow/green drainage.  No new rashes,  lesions, or skin breakdown.     4/28/2022:  Mr. Bone relays that he took last round of Doxycycline Q12H X 5 days, instead of daily X 10 days, as he relayed last week. Redness around stump all resolved today; no longer itching around wound bed. Has not applied any kind of tape to stump. Denies fevers, chills. No pain in wound. No new skin breakdown, rashes, or other skin issues.    4/21/2022:  Doxycycline, ordered on 4/6/2022, was prescribed daily, instead of the intended twice/daily frequency. He took that for ten days, daily. Had severe reaction to Versatel and steristrips from last week's application of advanced skin substitute. Skin very red over stump. He left last week's dressing in place, as instructed.     4/14/2022:  Has finished Doxycycline, without any new s/e. Had some difficulty keeping kerlix in place last week, and can only use paper tape. He secured dressing with additional gauze and applied an ACE wrap. Denies fevers, chills, redness, pain, odors, or strike-through drainage. No new skin breakdown, rashes, or other skin issues.    4/7/2020:  Picked up Doxycycline yesterday and has taken two doses; no new s/e with that. Mr. Bone agrees to move forward with an advanced skin substitute today; agrees to leave today's bandage on for one week, until he comes back to clinic. Experienced a reaction to a tape over past week; has a lot of redness around wound. No fevers, chills, odors, or yellow/green drainage. No new skin breakdown, rashes, or other skin issues.    4/1/2022:  States he is able to come to clinic weekly for me to manage his non-healing wound more aggressively. He is agreeable to a trial of a sample advanced skin substitute. Home health continues seeing him; nurse comes Monday. Denies fevers, chills, increased redness, pain, odors, or yellow/green drainage. No new skin breakdown, rashes, or other skin issues. Itching in stump is primarily up over thigh where tubigrip end lays.      3/4/2022:  Wound along middle area of stump has healed. Wound along lateral stump is healing in an abnormal shape. Touches base with [facility] monthly regarding prosthetic leg. No new complaints with wound or current wound care regimen. Has all wound care supplies in home. Home health continues seeing him once week. Denies fevers, chills, increased redness, increased pain, odors, or yellow/green drainage. No new skin breakdown, rashes, or other skin issues. Still wants to be seen on monthly basis.       Review of Systems:   Except as stated in HPI, all other 10 body systems normal      Patient Active Problem List   Diagnosis    Status post below-knee amputation of left lower extremity    Non-healing surgical wound    Complication of amputation stump of left lower extremity    Pre-diabetes    Contact dermatitis due to adhesive bandage    Iron deficiency anemia    Hypoproteinemia    Wheelchair confinement status    History of osteomyelitis    Atrial fibrillation    Chronic anemia    Alcohol abuse    Generalized edema    Adjustment disorder with mixed disturbance of emotions and conduct         Current Outpatient Medications on File Prior to Encounter   Medication Sig Dispense Refill    apixaban (ELIQUIS) 2.5 mg Tab Take 5 mg by mouth once daily at 6am.      clobetasol 0.05% (TEMOVATE) 0.05 % Oint Apply 1 application topically 2 (two) times daily. Apply to left BKA stump twice/day.      gabapentin (NEURONTIN) 300 MG capsule Take 300 mg by mouth 2 (two) times daily.      hydrALAZINE (APRESOLINE) 25 MG tablet Take 25 mg by mouth once daily at 6am.      iron 18 mg Tab 65 mg      potassium chloride SA (K-DUR,KLOR-CON) 20 MEQ tablet Take 20 mEq by mouth once daily at 6am.      spironolactone (ALDACTONE) 50 MG tablet Take 50 mg by mouth once daily at 6am.      triamcinolone acetonide 0.5% (KENALOG) 0.5 % Crea APPLY THIN LAYER TO SKIN TO RASH TWICE DAILY AS NEEDED ITCHING 15 g 3     No current  facility-administered medications on file prior to encounter.         Physical Exam Vitals & Measurements:   Vitals:    06/13/22 0758   BP: (!) 153/87   Pulse: 67   Temp: 97.9 °F (36.6 °C)         General:  Hypertensive, asymptomatic with; afebrile. Well-nourished; in no acute distress.   Respiratory: breathing even, quiet, and unlabored. No coughing.   Cardiovascular:  Significant stasis dermatitis over right anterior tibia, with mild non-pitting brawny edema to RLE.  No edema over LLE stump/knee joint.   Scattered hair distribution over BLE, including left BKA stump.  Left popliteal pulse dopplered 5/30/2022.   Musculoskeletal: left BKA; arrived via wheelchair; full range of motion of all extremities  Neurologic: A&O X 3; cranial nerves grossly intact  Psychiatric: Calm, cooperative. Responses appropriate.    Integumentary:  Laterally located full thickness wound:  100% pink smooth tissue.  No periwound erythema; no purulent drainage, or odors. Wound margins without induration, bogginess, or fluctuance.  Wound dimensions smaller from last visit.           Wound 05/09/22 0931 Venous Ulcer Left lateral Leg #1 (Active)   05/09/22 0931    Pre-existing:    Primary Wound Type: Venous ulcer   Side: Left   Orientation: lateral   Location: Leg   Wound Number: #1   Ankle-Brachial Index:    Pulses:    Removal Indication and Assessment:    Wound Outcome:    (Retired) Wound Type:    (Retired) Wound Length (cm):    (Retired) Wound Width (cm):    (Retired) Depth (cm):    Wound Description (Comments):    Removal Indications:    Dressing Appearance Moist drainage 06/13/22 0758   Drainage Amount Small 06/13/22 0758   Tissue loss description Full thickness 06/13/22 0758   Wound Length (cm) 0.5 cm 06/13/22 0758   Wound Width (cm) 1 cm 06/13/22 0758   Wound Depth (cm) 0.1 cm 06/13/22 0758   Wound Volume (cm^3) 0.05 cm^3 06/13/22 0758   Wound Surface Area (cm^2) 0.5 cm^2 06/13/22 0758   Care Cleansed with:;Antimicrobial agent 06/13/22  0758   Dressing Removed 06/13/22 0758                       Assessment/Plan:    Disruption external operation, subsequent visit (BKA due to osteomyelitis):  S/P left BKA 7/9/2021 with final closure on 7/12/2021.   S/P debridement in OR here @ [facility] on 9/16/2021, with LTACH stay @ [facility], where he received NPWT and IV antibiotics.   [facility] is following for left leg prosthetic leg.  Will be fitted for that, once wound has healed.    Mr. Bone is exhibiting adjustment disorder with depressed mood and impaired conduct, as a result of not being able to be mobile like he was prior to amputation.  Mood disorder is causing him to resume alcohol intake, per self-report 5/30/2022.    5/30/2022:  I discussed revision evaluation with general surgery, as his wound has not responded to all topical wound care interventions, including tx of wound infection.  Unable to apply advanced skin substitutes due to his severe allergic dermatitis related to all tapes, except paper.  Experienced severe reaction to Versatel, which was used to secure advanced skin sub in past.     Open wound to left BKA (originally) to fat layer:  Wound continues to heal with Clobetasol 0.05% ointment directly onto, and around, wound bed.  Given all of the severe allergies Mr. Bone has had to various tapes and topical products in past, we have chosen to treat ulcer as having an underlying inflammatory nature.    Wound healing had previously stalled, despite use of multiple products, including advanced skin substitutes.  Wound infection was treated, without subsequent wound healing.  Left popliteal pulse dopplered 5/30/2022.  5/23/2022 XR of left knee/stump did not show osteomyelitis.    5/26/2022 labs:  Pre-albumin 26.  CRP 4  5/17/2022:  Mr. Bone relays that he was sent to a nursing home when he was discharged from [facility] after amputation due to insurance coverage.  He did not have skilled nursing at that facility.  Mr. Bone showed me a  photo of wounds before September 2020 OR debridement.  Wound was very large and infected.    Wound Care Today/Continued Wound Care Treatment Plan:  Clean wound with Dakins 0.125% wound and pat dry.  Apply small amount of Calmoseptine around wound edges to prevent maceration.   Apply Clobetasol 0.05% to small piece of gauze and apply to wound bed, followed by gauze squares, followed by kerlix, followed by single-layer Coban to secure dressings.  May use paper tape.    Wound care to be done twice/day.   Will hold off on general surgery referral, as wound has responded very well to topical Clobetasol.      History of Osteomyelitis:  5/23/2022 XR of left knee/stump did not show osteomyelitis.    5/26/2022 labs:  CRP 4  Treated with 6 weeks of IV antibiotics at [facility] back in October 2021.      Adjustment Disorder with Depressed Mood and Impaired Conduct:  Impaired mobility is causing s/s of depression, AEB tearfulness during past clinic visits.  Self-reported increased eating and drinking beer because he has been unable to ambulate.  Having a lot of anxiety about not being able to be fitted with prosthetic leg.     ETOH abuse:  5/23/2022:  Self-reported intake of 4 beers/day.   Normal recent pre-albumin level of 26.  Recent thiamine level was also normal.  I have counseled Mr. Bone on long-term risks associated with daily ETOH intake, including delayed wound healing.  Cessation encouraged.    Edema in BLE:  Resolved  with dietary and beverage changes.      Contact Allergic Dermatitis:  Resolved with avoiding all tapes except for paper tape.  Prescribed Clobetasol 0.05% topical ointment twice/daily for any recurrent erythema and itching.   Mr. Bone cannot tolerate tapes other than paper tape.  Unable to tolerate Versatel, steristrips, or adhesive bandages.   Unable to resume advanced skin substitutes due to severe allergic contact dermatitis with steristrips and Versatel to secure skin substitutes.      Left RADHA  wound Infection:  Treated on 4/28/2022.    Wound Culture Results:  4/21/2022: Enterobacter cloacae complex and MSSA.   Treated with Bactrim DS 1 tab Q12H for ten days (4/28/2022).      Anemia:  This has been identified as a co-factor towards impaired wound healing.   4/26/2022: H&H 15.1 & 45.3.   10/30/2021: H&H 12.1 & 39.5. Plts 265. MCV 91.2. MCH 27.9.  10/24/2021: Iron 38. Ferritin 67.3. Folate nl. Prealbumin 21.0. B12 lvl 490. RBC 3.92. H&H 10.8 & 35.9. Plts 254. MCV and MCH low @ 27.6.   9/16/2021: RBC 2.75. H&H 7.9 & 25.7. Plts 248. MCV and MCH normal.   Prescribed ferrous sulfate 324 mg, with Vitamin C 500 mg daily. Handout given on foods higher in iron during previous visit.     Hypoproteinemia:  Resolved.   5/26/2022:  Pre-albumin 26.   4/26/2022: Albumin 4.1.    10/24/2021: pre-albumin 21.0.  7/21/2021: Albumin 2.6.   Prescribed 2-week course of Matt on 11/5/21.   Handout given on foods higher in protein to include/increase in diet during previous wound clinic visit.    Pre-diabetes with peripheral neuropathy:  4/26/2022: HgbA1C 6.0%.   10/30/2021: eGFR 65, with CR 1.19. HgbA1C 5.6. LDL 90.   7/2/2021: HgbA1C 5.9. Not prescribed any antidiabetic medications.   Being managed by PCP.    Wheelchair confined status:  This has been identified as a co-factor towards impaired wound healing.   [facility] is following for BKA prosthetic fitting, once wound has healed.              RTC in one week.  Instructions reinforced on s/s of deterioration to call wound clinic for promptly in between clinic visits so we can schedule him that day, or the next day.

## 2022-06-20 ENCOUNTER — HOSPITAL ENCOUNTER (OUTPATIENT)
Dept: WOUND CARE | Facility: HOSPITAL | Age: 67
Discharge: HOME | End: 2022-06-20
Attending: NURSE PRACTITIONER
Payer: MEDICARE

## 2022-06-20 VITALS — DIASTOLIC BLOOD PRESSURE: 67 MMHG | HEART RATE: 44 BPM | TEMPERATURE: 98 F | SYSTOLIC BLOOD PRESSURE: 157 MMHG

## 2022-06-20 DIAGNOSIS — F10.10 ALCOHOL ABUSE: ICD-10-CM

## 2022-06-20 DIAGNOSIS — T81.89XD NON-HEALING SURGICAL WOUND, SUBSEQUENT ENCOUNTER: ICD-10-CM

## 2022-06-20 DIAGNOSIS — R00.1 BRADYCARDIA: ICD-10-CM

## 2022-06-20 DIAGNOSIS — T87.9 COMPLICATION OF AMPUTATION STUMP OF LEFT LOWER EXTREMITY: ICD-10-CM

## 2022-06-20 DIAGNOSIS — I48.20 CHRONIC ATRIAL FIBRILLATION: ICD-10-CM

## 2022-06-20 DIAGNOSIS — Z87.39 HISTORY OF OSTEOMYELITIS: ICD-10-CM

## 2022-06-20 DIAGNOSIS — R73.03 PRE-DIABETES: ICD-10-CM

## 2022-06-20 DIAGNOSIS — Z89.512 STATUS POST BELOW-KNEE AMPUTATION OF LEFT LOWER EXTREMITY: Primary | ICD-10-CM

## 2022-06-20 DIAGNOSIS — D50.8 OTHER IRON DEFICIENCY ANEMIA: ICD-10-CM

## 2022-06-20 DIAGNOSIS — Z99.3 WHEELCHAIR CONFINEMENT STATUS: ICD-10-CM

## 2022-06-20 DIAGNOSIS — L23.1 CONTACT DERMATITIS DUE TO ADHESIVE BANDAGE: ICD-10-CM

## 2022-06-20 DIAGNOSIS — L97.922 NON-PRESSURE ULCER OF LEFT LOWER EXTREMITY WITH FAT LAYER EXPOSED: ICD-10-CM

## 2022-06-20 PROCEDURE — 99213 OFFICE O/P EST LOW 20 MIN: CPT | Mod: ,,, | Performed by: NURSE PRACTITIONER

## 2022-06-20 PROCEDURE — 99213 PR OFFICE/OUTPT VISIT, EST, LEVL III, 20-29 MIN: ICD-10-PCS | Mod: ,,, | Performed by: NURSE PRACTITIONER

## 2022-06-20 PROCEDURE — 99211 OFF/OP EST MAY X REQ PHY/QHP: CPT

## 2022-06-20 NOTE — PROGRESS NOTES
Chief Complaint:  Non-healing left BKA Stump Wound     History of Present Illness:  65 yo White male being seen for follow-up of post-operative left BKA stump following surgery done here @ [facility] on 7/9/2021, complicated by osteomyelitis, and dehiscence of incision line. Hospitalized 9/13/2021 - 9/16/2021 for OR debridement of significant non-healing stump wound. Discharged to [facility] on 9/16/2021 for ongoing IV antibiotics and NPWT. Discharged home 10/13/2021, with [employer]  Mr. Bone's new phone number: [phone].     Hx includes pre-diabetes, CKD2, A. fib (Eliquis), bradycardia, anemia, GERD, BPH, HLD, HTN, ETOH abuse (4 beers/day reported 5/23/2022), and obesity (BMI 31.66). During July hospitalization, he was noted to be bradycardic and underwent a Lexiscan which was negative for any ischemia or scarring. Echo performed 7/5/2021 showed reduced ejection fraction at 45 to 50% and biatrial enlargement. Never smoker.  with one grown son. Has an apartment of his own that is handicap friendly inside; however, outside is not. PCP is Dr. Giovany Duron in [place].  Current wound care to left stump: cleaning with Dakins 0.125%, followed by Calmoseptine around periwound margins, followed by Clobetasol 0.05%, followed by by gauze, kerlix, and nylon stocking to keep all dressings in place.    Advanced skin substitutes: [employer] NuShield applied #1 4/7/2022, and #2 on 4/14/2022. Wound did not respond to plain collagen. Mr. Bone cannot have any kind of tape (other than paper) on skin due to severe contact allergic dermatitis. Bactrim DS prescribed 4/28/2022 Q12H X ten days.     Review of Most Recent Labs:  5/26/2022:  Pre-albumin 26.  CRP 4  4/26/2022: eGFR >60, with CR 0.9. Albumin 4.1. HgbA1C 6.0%. H&H 15.1 & 45.3.   10/30/2021: eGFR 65, with CR 1.19. HgbA1C 5.6. LDL 90. WBC 7.2. H&H 12.1 & 39.5. Plts 265. MCV 91.2. MCH 27.9.  10/24/2021: eGFR 62.0, with CR 1.24. Iron 38.  Ferritin 67.3. Folate nl. Prealbumin 21.0. B12 lvl 490. WBC 7.6. RBC 3.92. H&H 10.8 &* 35.9. Plts 254. MCV and MCH low @ 27.6.   2021: eGFR >105. WBC 8.6. RBC 2.75. H&H 7.9 & 25.7. Plts 248. MCV and MCH normal.   2021: eGFR 69, with CR 1.14. AST 37. Albumin 2.6. H&H 9.6 & 31.1. Plts 355. MCV and MCH normal.   2021: HgbA1C 5.9.     Wound Culture Results:  2022: Enterobacter cloacae complex and MSSA. Both of those are susceptible to Bactrim.  Treated with 10 days of Bactrim BID (2022)  2022: MSSA. Doxycycline began on 4/6/2022 X 5 days.    Imagin2022:  XR left knee/stump:  No evidence of osteo  2021 CT of LLE: No evidence of osteo or septic arthritis.  No lower extremity vascular studies on chart.  Left popliteal artery dopplered 2022.        Today 2022:  No reported complications with wound care or current treatment plan.  Has all wound care supplies in home.  Denies fevers, chills, wound odor, wound redness, wound pain, or yellow/green drainage.  No new rashes, lesions, or skin breakdown.  Was told in the past that he would most likely need a PMM.  Denies dizziness, weakness, or symptoms with positional changes.  Not taking any BP medications that will decrease heart rate.  Continues taking Eliquis, as prescribed.  Is driving himself to wound clinic.      2022:  Swelling has resolved over left BKA stump, as well as RLE, with changes in diet to avoid salt.  He is drinking lime water.  No reported complications with wound care or current treatment plan.  Has all wound care supplies in home.  Denies fevers, chills, wound odor, wound redness, wound pain, or yellow/green drainage.  No new rashes, lesions, or skin breakdown.  Did not take morning medications this morning.     2022:  Primary complaint is increased swelling in left thigh and stump.  Right lower leg is also quite swollen today, which he was not aware of until comparing right and left legs together.   Noticed swelling Saturday night and Sunday morning.  No pain in either leg.  Taking Eliquis twice a day.  Taking his Spironolactone, as prescribed.  Reports he is getting depressed sitting in his apartment and is eating saltier foods and drinking beer.  Relays mood, motivation, and activity level would be significantly increased if he was  mobile with prosthetic left leg.  Wound is fairly unchanged from last week, with initiation of silver collagen.  No reported complications with wound care or current treatment plan.  Has all wound care supplies in home.  Denies fevers, chills, odors, redness, wound pain, or yellow/green drainage.  No new rashes, lesions, or skin breakdown.  Is agreeable to being referred back to general surgery for revision evaluation of his stump, if there is no response to topical Clobetasol onto wound bed this next week.  Says he will do whatever is needed so he can be fitted for a prosthetic leg.      5/23/2022:  Discouraged that wound measurements are not smaller from last visit.  Left Iodiplex on, as instructed last visit, until home health nurse changed it on Thursday.  No reported complications with wound care or current treatment plan.  Denies fevers, chills, odors, redness, pain, or yellow/green drainage.  No new rashes, lesions, or skin breakdown.   Admitted to resumption of drinking; drinks 4 beers a day, per self-report (12 oz).     5/16/2022:  No reported complications with wound care or current treatment plan.  Has all wound care supplies in home.  Denies fevers, chills, odors, redness, pain, or yellow/green drainage.  No new rashes, lesions, or skin breakdown.  Agreeable to trying to leave today's dressing on until Thursday.  Is able to use papertape, per self-report, without any redness.    05/09/2022:  Has finished Bactrim DS since last visit.  Had to use paper tape to keep dressing in place; no recurrent redness over stump.  Denies fevers, chills, odors, redness, pain, or  yellow/green drainage.  No new rashes, lesions, or skin breakdown.     4/28/2022:  Mr. Bone relays that he took last round of Doxycycline Q12H X 5 days, instead of daily X 10 days, as he relayed last week. Redness around stump all resolved today; no longer itching around wound bed. Has not applied any kind of tape to stump. Denies fevers, chills. No pain in wound. No new skin breakdown, rashes, or other skin issues.    4/21/2022:  Doxycycline, ordered on 4/6/2022, was prescribed daily, instead of the intended twice/daily frequency. He took that for ten days, daily. Had severe reaction to Versatel and steristrips from last week's application of advanced skin substitute. Skin very red over stump. He left last week's dressing in place, as instructed.     4/14/2022:  Has finished Doxycycline, without any new s/e. Had some difficulty keeping kerlix in place last week, and can only use paper tape. He secured dressing with additional gauze and applied an ACE wrap. Denies fevers, chills, redness, pain, odors, or strike-through drainage. No new skin breakdown, rashes, or other skin issues.    4/7/2020:  Picked up Doxycycline yesterday and has taken two doses; no new s/e with that. Mr. Bone agrees to move forward with an advanced skin substitute today; agrees to leave today's bandage on for one week, until he comes back to clinic. Experienced a reaction to a tape over past week; has a lot of redness around wound. No fevers, chills, odors, or yellow/green drainage. No new skin breakdown, rashes, or other skin issues.    4/1/2022:  States he is able to come to clinic weekly for me to manage his non-healing wound more aggressively. He is agreeable to a trial of a sample advanced skin substitute. Home health continues seeing him; nurse comes Monday. Denies fevers, chills, increased redness, pain, odors, or yellow/green drainage. No new skin breakdown, rashes, or other skin issues. Itching in stump is primarily up over  thigh where tubigrip end lays.     3/4/2022:  Wound along middle area of stump has healed. Wound along lateral stump is healing in an abnormal shape. Touches base with [facility] monthly regarding prosthetic leg. No new complaints with wound or current wound care regimen. Has all wound care supplies in home. Home health continues seeing him once week. Denies fevers, chills, increased redness, increased pain, odors, or yellow/green drainage. No new skin breakdown, rashes, or other skin issues. Still wants to be seen on monthly basis.       Review of Systems:   Except as stated in HPI, all other 10 body systems normal      Patient Active Problem List   Diagnosis    Status post below-knee amputation of left lower extremity    Non-healing surgical wound    Complication of amputation stump of left lower extremity    Pre-diabetes    Contact dermatitis due to adhesive bandage    Iron deficiency anemia    Hypoproteinemia    Wheelchair confinement status    History of osteomyelitis    Atrial fibrillation    Chronic anemia    Alcohol abuse    Generalized edema    Adjustment disorder with mixed disturbance of emotions and conduct         Current Outpatient Medications on File Prior to Encounter   Medication Sig Dispense Refill    apixaban (ELIQUIS) 2.5 mg Tab Take 5 mg by mouth once daily at 6am.      clobetasol 0.05% (TEMOVATE) 0.05 % Oint Apply 1 application topically 2 (two) times daily. Apply to left BKA stump twice/day. 1 each 3    gabapentin (NEURONTIN) 300 MG capsule Take 300 mg by mouth 2 (two) times daily.      hydrALAZINE (APRESOLINE) 25 MG tablet Take 25 mg by mouth once daily at 6am.      iron 18 mg Tab 65 mg      potassium chloride SA (K-DUR,KLOR-CON) 20 MEQ tablet Take 20 mEq by mouth once daily at 6am.      spironolactone (ALDACTONE) 50 MG tablet Take 50 mg by mouth once daily at 6am.      triamcinolone acetonide 0.5% (KENALOG) 0.5 % Crea APPLY THIN LAYER TO SKIN TO RASH TWICE DAILY AS  NEEDED ITCHING 15 g 3     No current facility-administered medications on file prior to encounter.         Physical Exam Vitals & Measurements:   Vitals:    06/20/22 0822   BP: (!) 157/67   Pulse: (!) 44   Temp: 97.9 °F (36.6 °C)         General:  Hypertensive, and bradycardic, asymptomatic with; afebrile. Well-nourished; in no acute distress.   Respiratory: breathing even, quiet, and unlabored. No coughing.   Cardiovascular:  Radial pulse checked for one minute, irregularly, irregular.  Significant stasis dermatitis over right anterior tibia, with moderate non-pitting brawny edema to RLE.   LLE BKA stump without edema; skin soft and supple.  Scattered hair distribution over BLE, including left BKA stump.  Left popliteal pulse dopplered 5/30/2022.   Musculoskeletal: left BKA; arrived via wheelchair; full range of motion of all extremities  Neurologic: A&O X 3; cranial nerves grossly intact  Psychiatric: Calm, cooperative. Responses appropriate.    Integumentary:  Laterally located full thickness wound:  100% pink smooth tissue.  No periwound erythema; no purulent drainage, or odors. Wound margins without induration, bogginess, or fluctuance.  Wound dimensions smaller from last visit.           Wound 05/09/22 0931 Venous Ulcer Left lateral Leg #1 (Active)   05/09/22 0931    Pre-existing:    Primary Wound Type: Venous ulcer   Side: Left   Orientation: lateral   Location: Leg   Wound Number: #1   Ankle-Brachial Index:    Pulses:    Removal Indication and Assessment:    Wound Outcome:    (Retired) Wound Type:    (Retired) Wound Length (cm):    (Retired) Wound Width (cm):    (Retired) Depth (cm):    Wound Description (Comments):    Removal Indications:    Dressing Appearance Moist drainage 06/20/22 0822   Drainage Amount Small 06/20/22 0822   Tissue loss description Full thickness 06/20/22 0822   Wound Length (cm) 0.3 cm 06/20/22 0822   Wound Width (cm) 0.6 cm 06/20/22 0822   Wound Depth (cm) 0.1 cm 06/20/22 0822   Wound  Volume (cm^3) 0.018 cm^3 06/20/22 0822   Wound Surface Area (cm^2) 0.18 cm^2 06/20/22 0822   Care Cleansed with:;Antimicrobial agent 06/20/22 0822                       Assessment/Plan:    Disruption external operation, subsequent visit (BKA due to osteomyelitis):  S/P left BKA 7/9/2021 with final closure on 7/12/2021.   S/P debridement in OR here @ [facility] on 9/16/2021, with LTACH stay @ [facility], where he received NPWT and IV antibiotics.   [facility] is following for left leg prosthetic leg.  Will be fitted for that, once wound has healed.    Mr. Bone is exhibiting adjustment disorder with depressed mood and impaired conduct, as a result of not being able to be mobile like he was prior to amputation.  Mood disorder is causing him to resume alcohol intake, per self-report 5/30/2022.    5/30/2022:  I discussed revision evaluation with general surgery, as his wound has not responded to all topical wound care interventions, including tx of wound infection.  Unable to apply advanced skin substitutes due to his severe allergic dermatitis related to all tapes, except paper.  Experienced severe reaction to Versatel, which was used to secure advanced skin sub in past.     Open wound to left BKA (originally) to fat layer:  Wound continues to heal in significantly with Clobetasol 0.05% ointment directly onto, and around, wound bed.  Given all of the severe allergies Mr. Bone has had to various tapes and topical products in past, we have chosen to treat ulcer as having an underlying inflammatory nature.    Wound healing had previously stalled, despite use of multiple products, including advanced skin substitutes.  Wound infection was treated, without subsequent wound healing.  Left popliteal pulse dopplered 5/30/2022.  5/23/2022 XR of left knee/stump did not show osteomyelitis.    5/26/2022 labs:  Pre-albumin 26.  CRP 4  5/17/2022:  Mr. Bone relays that he was sent to a nursing home when he was discharged from  [facility] after amputation due to insurance coverage.  He did not have skilled nursing at that facility.  Mr. Bone showed me a photo of wounds before September 2020 OR debridement.  Wound was very large and infected.    Wound Care Today/Continued Wound Care Treatment Plan:  Clean wound with Dakins 0.125% wound and pat dry.  Apply small amount of Calmoseptine around wound edges to prevent maceration.   Apply Clobetasol 0.05% to small piece of gauze and apply to wound bed, followed by gauze squares, followed by kerlix, followed by single-layer tubigrip to secure dressings.  May use paper tape.    Wound care to be done twice/day.   Will hold off on general surgery referral, as wound has responded very well to topical Clobetasol.      History of Osteomyelitis:  5/23/2022 XR of left knee/stump did not show osteomyelitis.    5/26/2022 labs:  CRP 4  Treated with 6 weeks of IV antibiotics at [facility] back in October 2021.      Contact Allergic Dermatitis:  Resolved with avoiding all tapes except for paper tape.  Prescribed Clobetasol 0.05% topical ointment twice/daily for any recurrent erythema and itching.   Mr. Bone cannot tolerate tapes other than paper tape.  Unable to tolerate Versatel, steristrips, or adhesive bandages.   Unable to resume advanced skin substitutes due to severe allergic contact dermatitis with steristrips and Versatel to secure skin substitutes.      Left BKA wound Infection:  Treated on 4/28/2022.    Wound Culture Results:  4/21/2022: Enterobacter cloacae complex and MSSA.   Treated with Bactrim DS 1 tab Q12H for ten days (4/28/2022).      A. Fib with Bradycardia:  Asymptomatic.  Pulse irrregularly, irregular.  Prescribed Eliquis.  Instructed him on s/s of bradycardia, and when to call 911.  Instructed him to not drive himself to ER due to risk of syncopal episodes with bradycardia.  Will continue to monitor.  He was agreeable to being referred to cardiologist, as he is not presently being followed by  cardiology.     Adjustment Disorder with Depressed Mood and Impaired Conduct:  Impaired mobility is causing s/s of depression, AEB tearfulness during past clinic visits.  Self-reported increased eating and drinking beer because he has been unable to ambulate.  Having a lot of anxiety about not being able to be fitted with prosthetic leg.     ETOH abuse:  5/23/2022:  Self-reported intake of 4 beers/day.   Normal recent pre-albumin level of 26.  I have counseled Mr. Bone on long-term risks associated with daily ETOH intake, including delayed wound healing.  Cessation encouraged.      Anemia:  This has been identified as a co-factor towards impaired wound healing.   4/26/2022: H&H 15.1 & 45.3.   10/30/2021: H&H 12.1 & 39.5. Plts 265. MCV 91.2. MCH 27.9.  10/24/2021: Iron 38. Ferritin 67.3. Folate nl. Prealbumin 21.0. B12 lvl 490. RBC 3.92. H&H 10.8 & 35.9. Plts 254. MCV and MCH low @ 27.6.   9/16/2021: RBC 2.75. H&H 7.9 & 25.7. Plts 248. MCV and MCH normal.   Prescribed ferrous sulfate 324 mg, with Vitamin C 500 mg daily. Handout given on foods higher in iron during previous visit.     Hypoproteinemia:  Resolved.   5/26/2022:  Pre-albumin 26.   4/26/2022: Albumin 4.1.    10/24/2021: pre-albumin 21.0.  7/21/2021: Albumin 2.6.   Prescribed 2-week course of Matt on 11/5/21.   Handout given on foods higher in protein to include/increase in diet during previous wound clinic visit.    Pre-diabetes with peripheral neuropathy:  4/26/2022: HgbA1C 6.0%.   10/30/2021: eGFR 65, with CR 1.19. HgbA1C 5.6. LDL 90.   7/2/2021: HgbA1C 5.9. Not prescribed any antidiabetic medications.   Being managed by PCP.    Wheelchair confined status:  This has been identified as a co-factor towards impaired wound healing.   [facility] is following for BKA prosthetic fitting, once wound has healed.              RTC in one week.  Instructed on s/s of deterioration to call wound clinic for promptly in between clinic visits so we can schedule  him that day, or the next day.

## 2022-06-27 ENCOUNTER — HOSPITAL ENCOUNTER (OUTPATIENT)
Dept: WOUND CARE | Facility: HOSPITAL | Age: 67
Discharge: HOME | End: 2022-06-27
Attending: NURSE PRACTITIONER
Payer: MEDICARE

## 2022-06-27 VITALS — DIASTOLIC BLOOD PRESSURE: 76 MMHG | SYSTOLIC BLOOD PRESSURE: 133 MMHG | TEMPERATURE: 98 F | HEART RATE: 65 BPM

## 2022-06-27 DIAGNOSIS — R73.03 PRE-DIABETES: ICD-10-CM

## 2022-06-27 DIAGNOSIS — D64.9 CHRONIC ANEMIA: ICD-10-CM

## 2022-06-27 DIAGNOSIS — Z89.512 STATUS POST BELOW-KNEE AMPUTATION OF LEFT LOWER EXTREMITY: Primary | ICD-10-CM

## 2022-06-27 DIAGNOSIS — Z99.3 WHEELCHAIR CONFINEMENT STATUS: ICD-10-CM

## 2022-06-27 DIAGNOSIS — I48.20 CHRONIC ATRIAL FIBRILLATION: ICD-10-CM

## 2022-06-27 DIAGNOSIS — Z79.01 LONG TERM (CURRENT) USE OF ANTICOAGULANTS: ICD-10-CM

## 2022-06-27 DIAGNOSIS — T87.9 COMPLICATION OF AMPUTATION STUMP OF LEFT LOWER EXTREMITY: ICD-10-CM

## 2022-06-27 DIAGNOSIS — Z87.39 HISTORY OF OSTEOMYELITIS: ICD-10-CM

## 2022-06-27 DIAGNOSIS — F10.10 ALCOHOL ABUSE: ICD-10-CM

## 2022-06-27 DIAGNOSIS — T81.89XD NON-HEALING SURGICAL WOUND, SUBSEQUENT ENCOUNTER: ICD-10-CM

## 2022-06-27 PROCEDURE — 99213 PR OFFICE/OUTPT VISIT, EST, LEVL III, 20-29 MIN: ICD-10-PCS | Mod: ,,, | Performed by: NURSE PRACTITIONER

## 2022-06-27 PROCEDURE — 99213 OFFICE O/P EST LOW 20 MIN: CPT | Mod: ,,, | Performed by: NURSE PRACTITIONER

## 2022-06-27 PROCEDURE — 99211 OFF/OP EST MAY X REQ PHY/QHP: CPT

## 2022-06-27 NOTE — PROGRESS NOTES
Chief Complaint:  Non-healing left BKA Stump Wound     History of Present Illness:  67 yo White male being seen for follow-up of post-operative left BKA stump following surgery done here @ [facility] on 7/9/2021, complicated by osteomyelitis, and dehiscence of incision line. Hospitalized 9/13/2021 - 9/16/2021 for OR debridement of significant non-healing stump wound. Discharged to [facility] on 9/16/2021 for ongoing IV antibiotics and NPWT. Discharged home 10/13/2021, with [email or web address]  Mr. Bone's new phone number: [phone].     Hx includes pre-diabetes, CKD2, A. fib (Eliquis), bradycardia, anemia, GERD, BPH, HLD, HTN, ETOH abuse (4 beers/day reported 5/23/2022), and obesity (BMI 31.66). During July hospitalization, he was noted to be bradycardic and underwent a Lexiscan which was negative for any ischemia or scarring. Echo performed 7/5/2021 showed reduced ejection fraction at 45 to 50% and biatrial enlargement. Never smoker.  with one grown son. Has an apartment of his own that is handicap friendly inside; however, outside is not. PCP is Dr. Giovany Duron in [place].  Current wound care to left stump: cleaning with Dakins 0.125%, followed by Calmoseptine around periwound margins, followed by Clobetasol 0.05%, followed by by gauze, kerlix, and nylon stocking to keep all dressings in place.    Advanced skin substitutes: [employer] NuShield applied #1 4/7/2022, and #2 on 4/14/2022. Wound did not respond to plain collagen. Mr. Bone cannot have any kind of tape (other than paper) on skin due to severe contact allergic dermatitis. Bactrim DS prescribed 4/28/2022 Q12H X ten days.     Review of Most Recent Labs:  5/26/2022:  Pre-albumin 26.  CRP 4  4/26/2022: eGFR >60, with CR 0.9. Albumin 4.1. HgbA1C 6.0%. H&H 15.1 & 45.3.   10/30/2021: eGFR 65, with CR 1.19. HgbA1C 5.6. LDL 90. WBC 7.2. H&H 12.1 & 39.5. Plts 265. MCV 91.2. MCH 27.9.  10/24/2021: eGFR 62.0, with CR 1.24. Iron 38.  Ferritin 67.3. Folate nl. Prealbumin 21.0. B12 lvl 490. WBC 7.6. RBC 3.92. H&H 10.8 &* 35.9. Plts 254. MCV and MCH low @ 27.6.   2021: eGFR >105. WBC 8.6. RBC 2.75. H&H 7.9 & 25.7. Plts 248. MCV and MCH normal.   2021: eGFR 69, with CR 1.14. AST 37. Albumin 2.6. H&H 9.6 & 31.1. Plts 355. MCV and MCH normal.   2021: HgbA1C 5.9.     Wound Culture Results:  2022: Enterobacter cloacae complex and MSSA. Both of those are susceptible to Bactrim.  Treated with 10 days of Bactrim BID (2022)  2022: MSSA. Doxycycline began on 4/6/2022 X 5 days.    Imagin2022:  XR left knee/stump:  No evidence of osteo  2021 CT of LLE: No evidence of osteo or septic arthritis.  No lower extremity vascular studies on chart.  Left popliteal artery dopplered 2022.        Today 2022:  No reported complications with wound care or current treatment plan.  Has all wound care supplies in home.  Denies fevers, chills, wound odor, wound redness, wound pain, or yellow/green drainage.  No new rashes, lesions, or skin breakdown.       2022:  No reported complications with wound care or current treatment plan.  Has all wound care supplies in home.  Denies fevers, chills, wound odor, wound redness, wound pain, or yellow/green drainage.  No new rashes, lesions, or skin breakdown.  Was told in the past that he would most likely need a PMM.  Denies dizziness, weakness, or symptoms with positional changes.  Not taking any BP medications that will decrease heart rate.  Continues taking Eliquis, as prescribed.  Is driving himself to wound clinic.      2022:  Swelling has resolved over left BKA stump, as well as RLE, with changes in diet to avoid salt.  He is drinking lime water.  No reported complications with wound care or current treatment plan.  Has all wound care supplies in home.  Denies fevers, chills, wound odor, wound redness, wound pain, or yellow/green drainage.  No new rashes, lesions, or skin  breakdown.  Did not take morning medications this morning.     5/30/2022:  Primary complaint is increased swelling in left thigh and stump.  Right lower leg is also quite swollen today, which he was not aware of until comparing right and left legs together.  Noticed swelling Saturday night and Sunday morning.  No pain in either leg.  Taking Eliquis twice a day.  Taking his Spironolactone, as prescribed.  Reports he is getting depressed sitting in his apartment and is eating saltier foods and drinking beer.  Relays mood, motivation, and activity level would be significantly increased if he was  mobile with prosthetic left leg.  Wound is fairly unchanged from last week, with initiation of silver collagen.  No reported complications with wound care or current treatment plan.  Has all wound care supplies in home.  Denies fevers, chills, odors, redness, wound pain, or yellow/green drainage.  No new rashes, lesions, or skin breakdown.  Is agreeable to being referred back to general surgery for revision evaluation of his stump, if there is no response to topical Clobetasol onto wound bed this next week.  Says he will do whatever is needed so he can be fitted for a prosthetic leg.      5/23/2022:  Discouraged that wound measurements are not smaller from last visit.  Left Iodiplex on, as instructed last visit, until home health nurse changed it on Thursday.  No reported complications with wound care or current treatment plan.  Denies fevers, chills, odors, redness, pain, or yellow/green drainage.  No new rashes, lesions, or skin breakdown.   Admitted to resumption of drinking; drinks 4 beers a day, per self-report (12 oz).     5/16/2022:  No reported complications with wound care or current treatment plan.  Has all wound care supplies in home.  Denies fevers, chills, odors, redness, pain, or yellow/green drainage.  No new rashes, lesions, or skin breakdown.  Agreeable to trying to leave today's dressing on until Thursday.   Is able to use papertape, per self-report, without any redness.    05/09/2022:  Has finished Bactrim DS since last visit.  Had to use paper tape to keep dressing in place; no recurrent redness over stump.  Denies fevers, chills, odors, redness, pain, or yellow/green drainage.  No new rashes, lesions, or skin breakdown.     4/28/2022:  Mr. Bone relays that he took last round of Doxycycline Q12H X 5 days, instead of daily X 10 days, as he relayed last week. Redness around stump all resolved today; no longer itching around wound bed. Has not applied any kind of tape to stump. Denies fevers, chills. No pain in wound. No new skin breakdown, rashes, or other skin issues.    4/21/2022:  Doxycycline, ordered on 4/6/2022, was prescribed daily, instead of the intended twice/daily frequency. He took that for ten days, daily. Had severe reaction to Versatel and steristrips from last week's application of advanced skin substitute. Skin very red over stump. He left last week's dressing in place, as instructed.     4/14/2022:  Has finished Doxycycline, without any new s/e. Had some difficulty keeping kerlix in place last week, and can only use paper tape. He secured dressing with additional gauze and applied an ACE wrap. Denies fevers, chills, redness, pain, odors, or strike-through drainage. No new skin breakdown, rashes, or other skin issues.    4/7/2020:  Picked up Doxycycline yesterday and has taken two doses; no new s/e with that. Mr. Bone agrees to move forward with an advanced skin substitute today; agrees to leave today's bandage on for one week, until he comes back to clinic. Experienced a reaction to a tape over past week; has a lot of redness around wound. No fevers, chills, odors, or yellow/green drainage. No new skin breakdown, rashes, or other skin issues.    4/1/2022:  States he is able to come to clinic weekly for me to manage his non-healing wound more aggressively. He is agreeable to a trial of a sample  advanced skin substitute. Home health continues seeing him; nurse comes Monday. Denies fevers, chills, increased redness, pain, odors, or yellow/green drainage. No new skin breakdown, rashes, or other skin issues. Itching in stump is primarily up over thigh where tubigrip end lays.     3/4/2022:  Wound along middle area of stump has healed. Wound along lateral stump is healing in an abnormal shape. Touches base with [facility] monthly regarding prosthetic leg. No new complaints with wound or current wound care regimen. Has all wound care supplies in home. Home health continues seeing him once week. Denies fevers, chills, increased redness, increased pain, odors, or yellow/green drainage. No new skin breakdown, rashes, or other skin issues. Still wants to be seen on monthly basis.       Review of Systems:   Except as stated in HPI, all other 10 body systems normal      Patient Active Problem List   Diagnosis    Status post below-knee amputation of left lower extremity    Non-healing surgical wound    Complication of amputation stump of left lower extremity    Pre-diabetes    Contact dermatitis due to adhesive bandage    Iron deficiency anemia    Hypoproteinemia    Wheelchair confinement status    History of osteomyelitis    Atrial fibrillation    Chronic anemia    Alcohol abuse    Generalized edema    Adjustment disorder with mixed disturbance of emotions and conduct         Current Outpatient Medications on File Prior to Encounter   Medication Sig Dispense Refill    apixaban (ELIQUIS) 2.5 mg Tab Take 5 mg by mouth once daily at 6am.      clobetasol 0.05% (TEMOVATE) 0.05 % Oint Apply 1 application topically 2 (two) times daily. Apply to left BKA stump twice/day. 1 each 3    gabapentin (NEURONTIN) 300 MG capsule Take 300 mg by mouth 2 (two) times daily.      hydrALAZINE (APRESOLINE) 25 MG tablet Take 25 mg by mouth once daily at 6am.      iron 18 mg Tab 65 mg      potassium chloride SA  (K-DUR,KLOR-CON) 20 MEQ tablet Take 20 mEq by mouth once daily at 6am.      spironolactone (ALDACTONE) 50 MG tablet Take 50 mg by mouth once daily at 6am.      triamcinolone acetonide 0.5% (KENALOG) 0.5 % Crea APPLY THIN LAYER TO SKIN TO RASH TWICE DAILY AS NEEDED ITCHING 15 g 3     No current facility-administered medications on file prior to encounter.         Physical Exam Vitals & Measurements:   Vitals:    06/27/22 0750   BP: 133/76   Pulse: 65   Temp: 97.9 °F (36.6 °C)         General:  VSS; afebrile. Well-nourished; in no acute distress.   Respiratory: breathing even, quiet, and unlabored. No coughing.   Cardiovascular:   Significant stasis dermatitis over right anterior tibia, with moderate non-pitting brawny edema to RLE.   LLE BKA stump without edema; skin soft and supple.  Scattered hair distribution over BLE, including left BKA stump.  Left popliteal pulse dopplered 5/30/2022.   Musculoskeletal: left BKA; arrived via wheelchair; full range of motion of all extremities  Neurologic: A&O X 3; cranial nerves grossly intact  Psychiatric: Calm, cooperative. Responses appropriate.    Integumentary:  Laterally located full thickness wound:  100% pink smooth tissue.  No periwound erythema; no purulent drainage, or odors. Wound margins without induration, bogginess, or fluctuance.  Wound dimensions smaller from last visit; wound is just about healed; it is quite small.           Wound 05/09/22 0931 Venous Ulcer Left lateral Leg #1 (Active)   05/09/22 0931    Pre-existing:    Primary Wound Type: Venous ulcer   Side: Left   Orientation: lateral   Location: Leg   Wound Number: #1   Ankle-Brachial Index:    Pulses:    Removal Indication and Assessment:    Wound Outcome:    (Retired) Wound Type:    (Retired) Wound Length (cm):    (Retired) Wound Width (cm):    (Retired) Depth (cm):    Wound Description (Comments):    Removal Indications:    Dressing Appearance Moist drainage 06/27/22 0750   Drainage Amount Small  06/27/22 0750   Tissue loss description Full thickness 06/27/22 0750   Wound Length (cm) 0.3 cm 06/27/22 0750   Wound Width (cm) 0.4 cm 06/27/22 0750   Wound Depth (cm) 0.1 cm 06/27/22 0750   Wound Volume (cm^3) 0.012 cm^3 06/27/22 0750   Wound Surface Area (cm^2) 0.12 cm^2 06/27/22 0750   Care Cleansed with:;Antimicrobial agent 06/27/22 0750                           Assessment/Plan:    Disruption external operation, subsequent visit (BKA due to osteomyelitis):  S/P left BKA 7/9/2021 with final closure on 7/12/2021.   S/P debridement in OR here @ [facility] on 9/16/2021, with [facility], where he received NPWT and IV antibiotics.   [facility] is following for left leg prosthetic leg.  Will be fitted for that, once wound has healed.    Mr. Bone is exhibiting adjustment disorder with depressed mood and impaired conduct, as a result of not being able to be mobile like he was prior to amputation.  Mood disorder is causing him to resume alcohol intake, per self-report 5/30/2022.    5/30/2022:  I discussed revision evaluation with general surgery, as his wound has not responded to all topical wound care interventions, including tx of wound infection.  Unable to apply advanced skin substitutes due to his severe allergic dermatitis related to all tapes, except paper.  Experienced severe reaction to Versatel, which was used to secure advanced skin sub in past.     Open wound to left BKA (originally) to fat layer:  Wound continues to heal in significantly with Clobetasol 0.05% ointment directly onto, and around, wound bed.  Given all of the severe allergies Mr. Bone has had to various tapes and topical products in past, we have chosen to treat ulcer as having an underlying inflammatory nature.    Wound healing had previously stalled, despite use of multiple products, including advanced skin substitutes.  Wound infection was treated, without subsequent wound healing.  Left popliteal pulse dopplered  5/30/2022.  5/23/2022 XR of left knee/stump did not show osteomyelitis.    5/26/2022 labs:  Pre-albumin 26.  CRP 4  5/17/2022:  Mr. Bone relays that he was sent to a nursing home when he was discharged from [facility] after amputation due to insurance coverage.  He did not have skilled nursing at that facility.  Mr. Bone showed me a photo of wounds before September 2020 OR debridement.  Wound was very large and infected.    Wound Care Today/New Wound Care Treatment Plan:  Discontinue Clobetasol for now.    Clean wound with Dakins 0.25% wound and pat dry.  Sprinke Stimulen powder into wound bed, using a spoon or something similar.  Cover with Versatel, followed by small piece of gauze.  Secure with paper tape.  To be changed on a Mon, Wed, Fri schedule.    Apply single-layer tubigrip G to secure dressings.       History of Osteomyelitis:  5/23/2022 XR of left knee/stump did not show osteomyelitis.    5/26/2022 labs:  CRP 4  Treated with 6 weeks of IV antibiotics at [facility] back in October 2021.      Contact Allergic Dermatitis:  Resolved with avoiding all tapes except for paper tape.  Prescribed Clobetasol 0.05% topical ointment twice/daily for any recurrent erythema and itching.   Mr. Bone cannot tolerate tapes other than paper tape.  Unable to tolerate Versatel, steristrips, or adhesive bandages.   Unable to resume advanced skin substitutes due to severe allergic contact dermatitis with steristrips and Versatel to secure skin substitutes.      Left BKA wound Infection:  Treated on 4/28/2022.    Wound Culture Results:  4/21/2022: Enterobacter cloacae complex and MSSA.   Treated with Bactrim DS 1 tab Q12H for ten days (4/28/2022).      A. Fib with Bradycardia:  Pulse stable today.  Prescribed Eliquis.  Will continue to monitor.      ETOH abuse:  5/23/2022:  Self-reported intake of 4 beers/day.   Normal recent pre-albumin level of 26.  I have counseled Mr. Bone on long-term risks associated with daily ETOH  intake, including delayed wound healing.  Cessation encouraged.      Anemia:  This has been identified as a co-factor towards impaired wound healing.   4/26/2022: H&H 15.1 & 45.3.   10/30/2021: H&H 12.1 & 39.5. Plts 265. MCV 91.2. MCH 27.9.  10/24/2021: Iron 38. Ferritin 67.3. Folate nl. Prealbumin 21.0. B12 lvl 490. RBC 3.92. H&H 10.8 & 35.9. Plts 254. MCV and MCH low @ 27.6.   9/16/2021: RBC 2.75. H&H 7.9 & 25.7. Plts 248. MCV and MCH normal.   Prescribed ferrous sulfate 324 mg, with Vitamin C 500 mg daily. Handout given on foods higher in iron during previous visit.     Hypoproteinemia:  Resolved.   5/26/2022:  Pre-albumin 26.   4/26/2022: Albumin 4.1.    10/24/2021: pre-albumin 21.0.  7/21/2021: Albumin 2.6.   Prescribed 2-week course of Matt on 11/5/21.   Handout given on foods higher in protein to include/increase in diet during previous wound clinic visit.    Pre-diabetes with peripheral neuropathy:  4/26/2022: HgbA1C 6.0%.   10/30/2021: eGFR 65, with CR 1.19. HgbA1C 5.6. LDL 90.   7/2/2021: HgbA1C 5.9. Not prescribed any antidiabetic medications.   Being managed by PCP.    Wheelchair confined status:  This has been identified as a co-factor towards impaired wound healing.   [facility] is following for BKA prosthetic fitting, once wound has healed.              RTC Wednesday of next week, given the 4th of July holiday.   Instructed on s/s of deterioration to call wound clinic for promptly in between clinic visits so we can schedule him that day, or the next day.

## 2022-07-06 ENCOUNTER — HOSPITAL ENCOUNTER (OUTPATIENT)
Dept: WOUND CARE | Facility: HOSPITAL | Age: 67
Discharge: HOME | End: 2022-07-06
Attending: NURSE PRACTITIONER
Payer: MEDICARE

## 2022-07-06 VITALS — HEART RATE: 54 BPM | DIASTOLIC BLOOD PRESSURE: 51 MMHG | TEMPERATURE: 98 F | SYSTOLIC BLOOD PRESSURE: 106 MMHG

## 2022-07-06 DIAGNOSIS — Z89.512 STATUS POST BELOW-KNEE AMPUTATION OF LEFT LOWER EXTREMITY: ICD-10-CM

## 2022-07-06 DIAGNOSIS — L97.922 NON-PRESSURE ULCER OF LEFT LOWER EXTREMITY WITH FAT LAYER EXPOSED: Primary | ICD-10-CM

## 2022-07-06 DIAGNOSIS — Z87.39 HISTORY OF OSTEOMYELITIS: ICD-10-CM

## 2022-07-06 DIAGNOSIS — F10.10 ALCOHOL ABUSE: ICD-10-CM

## 2022-07-06 DIAGNOSIS — D64.9 CHRONIC ANEMIA: ICD-10-CM

## 2022-07-06 DIAGNOSIS — Z99.3 WHEELCHAIR CONFINEMENT STATUS: ICD-10-CM

## 2022-07-06 DIAGNOSIS — T81.89XD NON-HEALING SURGICAL WOUND, SUBSEQUENT ENCOUNTER: ICD-10-CM

## 2022-07-06 DIAGNOSIS — R73.03 PRE-DIABETES: ICD-10-CM

## 2022-07-06 DIAGNOSIS — T87.9 COMPLICATION OF AMPUTATION STUMP OF LEFT LOWER EXTREMITY: ICD-10-CM

## 2022-07-06 PROCEDURE — 99213 OFFICE O/P EST LOW 20 MIN: CPT | Mod: ,,, | Performed by: NURSE PRACTITIONER

## 2022-07-06 PROCEDURE — 99211 OFF/OP EST MAY X REQ PHY/QHP: CPT

## 2022-07-06 PROCEDURE — 99213 PR OFFICE/OUTPT VISIT, EST, LEVL III, 20-29 MIN: ICD-10-PCS | Mod: ,,, | Performed by: NURSE PRACTITIONER

## 2022-07-06 NOTE — PROGRESS NOTES
Chief Complaint:  Non-healing left BKA Stump Wound     History of Present Illness:  65 yo White male being seen for follow-up of post-operative left BKA stump following surgery done here @ [facility] on 7/9/2021, complicated by osteomyelitis, and dehiscence of incision line. Hospitalized 9/13/2021 - 9/16/2021 for OR debridement of significant non-healing stump wound. Discharged to [facility] on 9/16/2021 for ongoing IV antibiotics and NPWT. Discharged home 10/13/2021, with [employer] [facility]  Mr. Bone's new phone number: [phone].     Hx includes pre-diabetes, CKD2, A. fib (Eliquis), bradycardia, anemia, GERD, BPH, HLD, HTN, ETOH abuse (4 beers/day reported 5/23/2022), and obesity (BMI 31.66). During July hospitalization, he was noted to be bradycardic and underwent a Lexiscan which was negative for any ischemia or scarring. Echo performed 7/5/2021 showed reduced ejection fraction at 45 to 50% and biatrial enlargement. Never smoker.  with one grown son. Has an apartment of his own that is handicap friendly inside; however, outside is not. PCP is Dr. Giovany Duron in [place].  Current wound care to left stump: cleaning with Dakins 0.125%, followed by Stimulen powder, followed by by gauze, and small piece of tubigrip to keep all dressing in place.    Advanced skin substitutes: [employer] [facility] applied #1 4/7/2022, and #2 on 4/14/2022. Wound did not respond to plain collagen. Mr. Bone cannot have any kind of tape (other than paper) on skin due to severe contact allergic dermatitis. Bactrim DS prescribed 4/28/2022 Q12H X ten days.     Review of Most Recent Labs:  5/26/2022:  Pre-albumin 26.  CRP 4  4/26/2022: eGFR >60, with CR 0.9. Albumin 4.1. HgbA1C 6.0%. H&H 15.1 & 45.3.   10/30/2021: eGFR 65, with CR 1.19. HgbA1C 5.6. LDL 90. WBC 7.2. H&H 12.1 & 39.5. Plts 265. MCV 91.2. MCH 27.9.  10/24/2021: eGFR 62.0, with CR 1.24. Iron 38. Ferritin 67.3. Folate nl. Prealbumin 21.0. B12 lvl 490.  WBC 7.6. RBC 3.92. H&H 10.8 &* 35.9. Plts 254. MCV and MCH low @ 27.6.   2021: eGFR >105. WBC 8.6. RBC 2.75. H&H 7.9 & 25.7. Plts 248. MCV and MCH normal.   2021: eGFR 69, with CR 1.14. AST 37. Albumin 2.6. H&H 9.6 & 31.1. Plts 355. MCV and MCH normal.   2021: HgbA1C 5.9.     Wound Culture Results:  2022: Enterobacter cloacae complex and MSSA. Both of those are susceptible to Bactrim.  Treated with 10 days of Bactrim BID (2022)  2022: MSSA. Doxycycline began on 4/6/2022 X 5 days.    Imagin2022:  XR left knee/stump:  No evidence of osteo  2021 CT of LLE: No evidence of osteo or septic arthritis.  No lower extremity vascular studies on chart.  Left popliteal artery dopplered 2022.        Today 2022:  No reported complications with wound care or current treatment plan.  Used up all of the Stimulen powder.   Denies fevers, chills, wound odor, wound redness, wound pain, or yellow/green drainage.  No new rashes, lesions, or skin breakdown.     2022:  No reported complications with wound care or current treatment plan.  Has all wound care supplies in home.  Denies fevers, chills, wound odor, wound redness, wound pain, or yellow/green drainage.  No new rashes, lesions, or skin breakdown.       2022:  No reported complications with wound care or current treatment plan.  Has all wound care supplies in home.  Denies fevers, chills, wound odor, wound redness, wound pain, or yellow/green drainage.  No new rashes, lesions, or skin breakdown.  Was told in the past that he would most likely need a PMM.  Denies dizziness, weakness, or symptoms with positional changes.  Not taking any BP medications that will decrease heart rate.  Continues taking Eliquis, as prescribed.  Is driving himself to wound clinic.      2022:  Swelling has resolved over left BKA stump, as well as RLE, with changes in diet to avoid salt.  He is drinking lime water.  No reported complications  with wound care or current treatment plan.  Has all wound care supplies in home.  Denies fevers, chills, wound odor, wound redness, wound pain, or yellow/green drainage.  No new rashes, lesions, or skin breakdown.  Did not take morning medications this morning.     5/30/2022:  Primary complaint is increased swelling in left thigh and stump.  Right lower leg is also quite swollen today, which he was not aware of until comparing right and left legs together.  Noticed swelling Saturday night and Sunday morning.  No pain in either leg.  Taking Eliquis twice a day.  Taking his Spironolactone, as prescribed.  Reports he is getting depressed sitting in his apartment and is eating saltier foods and drinking beer.  Relays mood, motivation, and activity level would be significantly increased if he was  mobile with prosthetic left leg.  Wound is fairly unchanged from last week, with initiation of silver collagen.  No reported complications with wound care or current treatment plan.  Has all wound care supplies in home.  Denies fevers, chills, odors, redness, wound pain, or yellow/green drainage.  No new rashes, lesions, or skin breakdown.  Is agreeable to being referred back to general surgery for revision evaluation of his stump, if there is no response to topical Clobetasol onto wound bed this next week.  Says he will do whatever is needed so he can be fitted for a prosthetic leg.      5/23/2022:  Discouraged that wound measurements are not smaller from last visit.  Left Iodiplex on, as instructed last visit, until home health nurse changed it on Thursday.  No reported complications with wound care or current treatment plan.  Denies fevers, chills, odors, redness, pain, or yellow/green drainage.  No new rashes, lesions, or skin breakdown.   Admitted to resumption of drinking; drinks 4 beers a day, per self-report (12 oz).     5/16/2022:  No reported complications with wound care or current treatment plan.  Has all wound care  supplies in home.  Denies fevers, chills, odors, redness, pain, or yellow/green drainage.  No new rashes, lesions, or skin breakdown.  Agreeable to trying to leave today's dressing on until Thursday.  Is able to use papertape, per self-report, without any redness.    05/09/2022:  Has finished Bactrim DS since last visit.  Had to use paper tape to keep dressing in place; no recurrent redness over stump.  Denies fevers, chills, odors, redness, pain, or yellow/green drainage.  No new rashes, lesions, or skin breakdown.     4/28/2022:  Mr. Bone relays that he took last round of Doxycycline Q12H X 5 days, instead of daily X 10 days, as he relayed last week. Redness around stump all resolved today; no longer itching around wound bed. Has not applied any kind of tape to stump. Denies fevers, chills. No pain in wound. No new skin breakdown, rashes, or other skin issues.    4/21/2022:  Doxycycline, ordered on 4/6/2022, was prescribed daily, instead of the intended twice/daily frequency. He took that for ten days, daily. Had severe reaction to Versatel and steristrips from last week's application of advanced skin substitute. Skin very red over stump. He left last week's dressing in place, as instructed.     4/14/2022:  Has finished Doxycycline, without any new s/e. Had some difficulty keeping kerlix in place last week, and can only use paper tape. He secured dressing with additional gauze and applied an ACE wrap. Denies fevers, chills, redness, pain, odors, or strike-through drainage. No new skin breakdown, rashes, or other skin issues.    4/7/2020:  Picked up Doxycycline yesterday and has taken two doses; no new s/e with that. Mr. Bone agrees to move forward with an advanced skin substitute today; agrees to leave today's bandage on for one week, until he comes back to clinic. Experienced a reaction to a tape over past week; has a lot of redness around wound. No fevers, chills, odors, or yellow/green drainage. No new  skin breakdown, rashes, or other skin issues.    4/1/2022:  States he is able to come to clinic weekly for me to manage his non-healing wound more aggressively. He is agreeable to a trial of a sample advanced skin substitute. Home health continues seeing him; nurse comes Monday. Denies fevers, chills, increased redness, pain, odors, or yellow/green drainage. No new skin breakdown, rashes, or other skin issues. Itching in stump is primarily up over thigh where tubigrip end lays.     3/4/2022:  Wound along middle area of stump has healed. Wound along lateral stump is healing in an abnormal shape. Touches base with [facility] monthly regarding prosthetic leg. No new complaints with wound or current wound care regimen. Has all wound care supplies in home. Home health continues seeing him once week. Denies fevers, chills, increased redness, increased pain, odors, or yellow/green drainage. No new skin breakdown, rashes, or other skin issues. Still wants to be seen on monthly basis.       Review of Systems:   Except as stated in HPI, all other 10 body systems normal      Patient Active Problem List   Diagnosis    Status post below-knee amputation of left lower extremity    Non-healing surgical wound    Complication of amputation stump of left lower extremity    Pre-diabetes    Contact dermatitis due to adhesive bandage    Iron deficiency anemia    Hypoproteinemia    Wheelchair confinement status    History of osteomyelitis    Atrial fibrillation    Chronic anemia    Alcohol abuse    Generalized edema    Adjustment disorder with mixed disturbance of emotions and conduct         Current Outpatient Medications on File Prior to Encounter   Medication Sig Dispense Refill    apixaban (ELIQUIS) 2.5 mg Tab Take 5 mg by mouth once daily at 6am.      clobetasol 0.05% (TEMOVATE) 0.05 % Oint Apply 1 application topically 2 (two) times daily. Apply to left BKA stump twice/day. 1 each 3    gabapentin (NEURONTIN) 300  MG capsule Take 300 mg by mouth 2 (two) times daily.      hydrALAZINE (APRESOLINE) 25 MG tablet Take 25 mg by mouth once daily at 6am.      iron 18 mg Tab 65 mg      potassium chloride SA (K-DUR,KLOR-CON) 20 MEQ tablet Take 20 mEq by mouth once daily at 6am.      spironolactone (ALDACTONE) 50 MG tablet Take 50 mg by mouth once daily at 6am.      triamcinolone acetonide 0.5% (KENALOG) 0.5 % Crea APPLY THIN LAYER TO SKIN TO RASH TWICE DAILY AS NEEDED ITCHING 15 g 3     No current facility-administered medications on file prior to encounter.         Physical Exam Vitals & Measurements:   Vitals:    07/06/22 0906   BP: (!) 106/51   Pulse: (!) 54   Temp: 98.2 °F (36.8 °C)         General:  Hypotensive and bradycardic, asymptomatic with; afebrile. Well-nourished; in no acute distress.   Respiratory: breathing even, quiet, and unlabored. No coughing.   Cardiovascular:   Significant stasis dermatitis over right anterior tibia, with moderate non-pitting brawny edema to RLE.   LLE BKA stump without edema; skin soft and supple.  Scattered hair distribution over BLE, including left BKA stump.  Left popliteal pulse dopplered 5/30/2022.   Musculoskeletal: left BKA; arrived via wheelchair; full range of motion of all extremities  Neurologic: A&O X 3; cranial nerves grossly intact  Psychiatric: Calm, cooperative. Responses appropriate.    Integumentary:  Laterally located partial-thickness wound:  100% pink smooth tissue.  No periwound erythema; no purulent drainage, or odors. Wound margins without induration, bogginess, or fluctuance.  Wound dimensions the same as last visit; wound is just about healed; it remains quite small.           Wound 05/09/22 0931 Venous Ulcer Left lateral Leg #1 (Active)   05/09/22 0931    Pre-existing:    Primary Wound Type: Venous ulcer   Side: Left   Orientation: lateral   Location: Leg   Wound Number: #1   Ankle-Brachial Index:    Pulses:    Removal Indication and Assessment:    Wound Outcome:     (Retired) Wound Type:    (Retired) Wound Length (cm):    (Retired) Wound Width (cm):    (Retired) Depth (cm):    Wound Description (Comments):    Removal Indications:    Dressing Appearance Moist drainage 07/06/22 0908   Drainage Amount Small 07/06/22 0908   Tissue loss description Partial thickness 07/06/22 0908   Wound Length (cm) 0.3 cm 07/06/22 0908   Wound Width (cm) 0.4 cm 07/06/22 0908   Wound Depth (cm) 0.1 cm 07/06/22 0908   Wound Volume (cm^3) 0.012 cm^3 07/06/22 0908   Wound Surface Area (cm^2) 0.12 cm^2 07/06/22 0908   Care Cleansed with:;Antimicrobial agent 07/06/22 0908                     Assessment/Plan:    Disruption external operation, subsequent visit (BKA due to osteomyelitis):  S/P left BKA 7/9/2021 with final closure on 7/12/2021.   S/P debridement in OR here @ [facility] on 9/16/2021, with LTACH stay @ [facility], where he received NPWT and IV antibiotics.   [facility] is following Mr. Bone for LLE prosthetic leg.  Given the smallness of one remaining wound, I relayed to Mr. Bone that he could begin fitting process for prosthetic leg, while we continue to work to get wound to final closure.  Discussed use of a hydrocolloid, in worse case scenario, to off-load any friction/pressure with the prosthetic leg so he can get up walking again.  He was agreeable with that plan.    I called Nader with [facility] and left a message indicating Mr. Bone can proceed with stump  and prosthetic leg fitting; and, that we did not need to delay getting leg fitted, and in use, while very small ulcer continues to heal.      Open wound to left BKA (originally) to fat layer:  Wound healing stalled with Stimulin powder.  Healed better with topical Clobetasol.   Wound Care Today/New Wound Care Treatment Plan:  Clean wound with Dakins 0.25% wound and pat dry.  Apply very small amount of Clobetasol to very small piece of gauze and place onto wound, followed by single-layer tubigrip G to secure  dressings.   To be done twice/day.      History of Osteomyelitis:  5/23/2022 XR of left knee/stump did not show osteomyelitis.    5/26/2022 labs:  CRP 4  Treated with 6 weeks of IV antibiotics at [facility] back in October 2021.      Contact Allergic Dermatitis:  Resolved with avoiding all tapes except for paper tape.  Prescribed Clobetasol 0.05% topical ointment twice/daily for any recurrent erythema and itching.   Mr. Bone cannot tolerate tapes other than paper tape.  Unable to tolerate Versatel, steristrips, or adhesive bandages.   Unable to resume advanced skin substitutes due to severe allergic contact dermatitis with steristrips and Versatel to secure skin substitutes.      Left BKA wound Infection:  Treated on 4/28/2022.    Wound Culture Results:  4/21/2022: Enterobacter cloacae complex and MSSA.   Treated with Bactrim DS 1 tab Q12H for ten days (4/28/2022).      ETOH abuse:  5/23/2022:  Self-reported intake of 4 beers/day.   Normal recent pre-albumin level of 26.  I have counseled Mr. Bone on long-term risks associated with daily ETOH intake, including delayed wound healing.  Cessation encouraged.   As above, need to get him up and ambulating with prosthetic leg to avoid worsening depression of mood, with increased ETOH intake.     Anemia:  This has been identified as a co-factor towards impaired wound healing.   4/26/2022: H&H 15.1 & 45.3.   10/30/2021: H&H 12.1 & 39.5. Plts 265. MCV 91.2. MCH 27.9.  10/24/2021: Iron 38. Ferritin 67.3. Folate nl. Prealbumin 21.0. B12 lvl 490. RBC 3.92. H&H 10.8 & 35.9. Plts 254. MCV and MCH low @ 27.6.   9/16/2021: RBC 2.75. H&H 7.9 & 25.7. Plts 248. MCV and MCH normal.   Prescribed ferrous sulfate 324 mg, with Vitamin C 500 mg daily. Handout given on foods higher in iron during previous visit.     Hypoproteinemia:  Resolved.   5/26/2022:  Pre-albumin 26.   4/26/2022: Albumin 4.1.    10/24/2021: pre-albumin 21.0.  7/21/2021: Albumin 2.6.   Prescribed 2-week course of  Matt on 11/5/21.   Handout given on foods higher in protein to include/increase in diet during previous wound clinic visit.    Pre-diabetes with peripheral neuropathy:  4/26/2022: HgbA1C 6.0%.   10/30/2021: eGFR 65, with CR 1.19. HgbA1C 5.6. LDL 90.   7/2/2021: HgbA1C 5.9. Not prescribed any antidiabetic medications.   Being managed by PCP.    Wheelchair confined status:  This has been identified as a co-factor towards impaired wound healing and worsening mood and ETOH intake.                 RTC in ten days.   Instructed on s/s of deterioration to call wound clinic for promptly in between clinic visits so we can schedule him that day, or the next day.  Copy of today's visit faxed to [employer].

## 2022-07-18 ENCOUNTER — HOSPITAL ENCOUNTER (OUTPATIENT)
Dept: WOUND CARE | Facility: HOSPITAL | Age: 67
Discharge: HOME | End: 2022-07-18
Attending: NURSE PRACTITIONER
Payer: MEDICARE

## 2022-07-18 VITALS — TEMPERATURE: 98 F | HEART RATE: 69 BPM | SYSTOLIC BLOOD PRESSURE: 133 MMHG | DIASTOLIC BLOOD PRESSURE: 80 MMHG

## 2022-07-18 DIAGNOSIS — Z89.512 STATUS POST BELOW-KNEE AMPUTATION OF LEFT LOWER EXTREMITY: ICD-10-CM

## 2022-07-18 DIAGNOSIS — T81.89XD NON-HEALING SURGICAL WOUND, SUBSEQUENT ENCOUNTER: ICD-10-CM

## 2022-07-18 DIAGNOSIS — T87.9 COMPLICATION OF AMPUTATION STUMP OF LEFT LOWER EXTREMITY: ICD-10-CM

## 2022-07-18 DIAGNOSIS — Z87.39 HISTORY OF OSTEOMYELITIS: ICD-10-CM

## 2022-07-18 DIAGNOSIS — Z99.3 WHEELCHAIR CONFINEMENT STATUS: ICD-10-CM

## 2022-07-18 DIAGNOSIS — I48.20 CHRONIC ATRIAL FIBRILLATION: ICD-10-CM

## 2022-07-18 DIAGNOSIS — Z79.01 LONG TERM (CURRENT) USE OF ANTICOAGULANTS: ICD-10-CM

## 2022-07-18 DIAGNOSIS — F10.10 ALCOHOL ABUSE: ICD-10-CM

## 2022-07-18 DIAGNOSIS — L97.922 NON-PRESSURE ULCER OF LEFT LOWER EXTREMITY WITH FAT LAYER EXPOSED: Primary | ICD-10-CM

## 2022-07-18 DIAGNOSIS — R73.03 PRE-DIABETES: ICD-10-CM

## 2022-07-18 DIAGNOSIS — D64.9 CHRONIC ANEMIA: ICD-10-CM

## 2022-07-18 PROCEDURE — 99213 OFFICE O/P EST LOW 20 MIN: CPT | Mod: ,,, | Performed by: NURSE PRACTITIONER

## 2022-07-18 PROCEDURE — 99213 PR OFFICE/OUTPT VISIT, EST, LEVL III, 20-29 MIN: ICD-10-PCS | Mod: ,,, | Performed by: NURSE PRACTITIONER

## 2022-07-18 PROCEDURE — 99211 OFF/OP EST MAY X REQ PHY/QHP: CPT

## 2022-07-18 NOTE — PROGRESS NOTES
Chief Complaint:  Non-healing left BKA Stump Wound     History of Present Illness:  67 yo White male being seen for follow-up of post-operative left BKA stump following surgery done here @ [facility] on 7/9/2021, complicated by osteomyelitis, and dehiscence of incision line. Hospitalized 9/13/2021 - 9/16/2021 for OR debridement of significant non-healing stump wound. Discharged to [facility] on 9/16/2021 for ongoing IV antibiotics and NPWT. Discharged home 10/13/2021, with [employer]  Mr. Bone's new phone number: [phone].     Hx includes pre-diabetes, CKD2, A. fib (Eliquis), bradycardia, anemia, GERD, BPH, HLD, HTN, ETOH abuse (2 beers/day reported 7/18/2022), and obesity (BMI 31.66). During July hospitalization, he was noted to be bradycardic and underwent a Lexiscan which was negative for any ischemia or scarring. Echo performed 7/5/2021 showed reduced ejection fraction at 45 to 50% and biatrial enlargement. Never smoker.  with one grown son. Has an apartment of his own that is handicap friendly inside; however, outside is not. PCP is Dr. Giovany Duron in [place].  Current wound care to left stump: cleaning with Dakins 0.125%, followed by Clotetasol cream, followed by gauze, followed by small piece of tubigrip to keep all dressing in place.    Advanced skin substitutes: [employer] [facility] applied #1 4/7/2022, and #2 on 4/14/2022. Wound did not respond to plain collagen. Mr. Bone cannot have any kind of tape (other than paper) on skin due to severe contact allergic dermatitis. Bactrim DS prescribed 4/28/2022 Q12H X ten days.     Review of Most Recent Labs:  5/26/2022:  Pre-albumin 26.  CRP 4  4/26/2022: eGFR >60, with CR 0.9. Albumin 4.1. HgbA1C 6.0%. H&H 15.1 & 45.3.   10/30/2021: eGFR 65, with CR 1.19. HgbA1C 5.6. LDL 90. WBC 7.2. H&H 12.1 & 39.5. Plts 265. MCV 91.2. MCH 27.9.  10/24/2021: eGFR 62.0, with CR 1.24. Iron 38. Ferritin 67.3. Folate nl. Prealbumin 21.0. B12 lvl  490. WBC 7.6. RBC 3.92. H&H 10.8 &* 35.9. Plts 254. MCV and MCH low @ 27.6.   2021: eGFR >105. WBC 8.6. RBC 2.75. H&H 7.9 & 25.7. Plts 248. MCV and MCH normal.   2021: eGFR 69, with CR 1.14. AST 37. Albumin 2.6. H&H 9.6 & 31.1. Plts 355. MCV and MCH normal.   2021: HgbA1C 5.9.     Wound Culture Results:  2022: Enterobacter cloacae complex and MSSA. Both of those are susceptible to Bactrim.  Treated with 10 days of Bactrim BID (2022)  2022: MSSA. Doxycycline began on 4/6/2022 X 5 days.    Imagin2022:  XR left knee/stump:  No evidence of osteo  2021 CT of LLE: No evidence of osteo or septic arthritis.  No lower extremity vascular studies on chart.  Left popliteal artery dopplered 2022.        Today 2022:  [facility] saw him since last wound clinic visit; wearing a Size 3 stump .  To be fitted for prosthetic leg this Thursday.  No reported complications with wound care or current treatment plan.  Has all wound care supplies in home.  Denies fevers, chills, wound odor, wound redness, wound pain, or yellow/green drainage.  No new rashes, lesions, or skin breakdown.  Has cut back from 4 beers/day to 2 beers/day, per self-report.      2022:  No reported complications with wound care or current treatment plan.  Used up all of the Stimulen powder.   Denies fevers, chills, wound odor, wound redness, wound pain, or yellow/green drainage.  No new rashes, lesions, or skin breakdown.     2022:  No reported complications with wound care or current treatment plan.  Has all wound care supplies in home.  Denies fevers, chills, wound odor, wound redness, wound pain, or yellow/green drainage.  No new rashes, lesions, or skin breakdown.       2022:  No reported complications with wound care or current treatment plan.  Has all wound care supplies in home.  Denies fevers, chills, wound odor, wound redness, wound pain, or yellow/green drainage.  No new rashes,  lesions, or skin breakdown.  Was told in the past that he would most likely need a PMM.  Denies dizziness, weakness, or symptoms with positional changes.  Not taking any BP medications that will decrease heart rate.  Continues taking Eliquis, as prescribed.  Is driving himself to wound clinic.      6/6/2022:  Swelling has resolved over left BKA stump, as well as RLE, with changes in diet to avoid salt.  He is drinking lime water.  No reported complications with wound care or current treatment plan.  Has all wound care supplies in home.  Denies fevers, chills, wound odor, wound redness, wound pain, or yellow/green drainage.  No new rashes, lesions, or skin breakdown.  Did not take morning medications this morning.     5/30/2022:  Primary complaint is increased swelling in left thigh and stump.  Right lower leg is also quite swollen today, which he was not aware of until comparing right and left legs together.  Noticed swelling Saturday night and Sunday morning.  No pain in either leg.  Taking Eliquis twice a day.  Taking his Spironolactone, as prescribed.  Reports he is getting depressed sitting in his apartment and is eating saltier foods and drinking beer.  Relays mood, motivation, and activity level would be significantly increased if he was  mobile with prosthetic left leg.  Wound is fairly unchanged from last week, with initiation of silver collagen.  No reported complications with wound care or current treatment plan.  Has all wound care supplies in home.  Denies fevers, chills, odors, redness, wound pain, or yellow/green drainage.  No new rashes, lesions, or skin breakdown.  Is agreeable to being referred back to general surgery for revision evaluation of his stump, if there is no response to topical Clobetasol onto wound bed this next week.  Says he will do whatever is needed so he can be fitted for a prosthetic leg.      5/23/2022:  Discouraged that wound measurements are not smaller from last visit.  Left  Iodiplex on, as instructed last visit, until home health nurse changed it on Thursday.  No reported complications with wound care or current treatment plan.  Denies fevers, chills, odors, redness, pain, or yellow/green drainage.  No new rashes, lesions, or skin breakdown.   Admitted to resumption of drinking; drinks 4 beers a day, per self-report (12 oz).     5/16/2022:  No reported complications with wound care or current treatment plan.  Has all wound care supplies in home.  Denies fevers, chills, odors, redness, pain, or yellow/green drainage.  No new rashes, lesions, or skin breakdown.  Agreeable to trying to leave today's dressing on until Thursday.  Is able to use papertape, per self-report, without any redness.    05/09/2022:  Has finished Bactrim DS since last visit.  Had to use paper tape to keep dressing in place; no recurrent redness over stump.  Denies fevers, chills, odors, redness, pain, or yellow/green drainage.  No new rashes, lesions, or skin breakdown.     4/28/2022:  Mr. Bone relays that he took last round of Doxycycline Q12H X 5 days, instead of daily X 10 days, as he relayed last week. Redness around stump all resolved today; no longer itching around wound bed. Has not applied any kind of tape to stump. Denies fevers, chills. No pain in wound. No new skin breakdown, rashes, or other skin issues.    4/21/2022:  Doxycycline, ordered on 4/6/2022, was prescribed daily, instead of the intended twice/daily frequency. He took that for ten days, daily. Had severe reaction to Versatel and steristrips from last week's application of advanced skin substitute. Skin very red over stump. He left last week's dressing in place, as instructed.     4/14/2022:  Has finished Doxycycline, without any new s/e. Had some difficulty keeping kerlix in place last week, and can only use paper tape. He secured dressing with additional gauze and applied an ACE wrap. Denies fevers, chills, redness, pain, odors, or  strike-through drainage. No new skin breakdown, rashes, or other skin issues.      Review of Systems:   Except as stated in HPI, all other 10 body systems normal      Patient Active Problem List   Diagnosis    Status post below-knee amputation of left lower extremity    Non-healing surgical wound    Complication of amputation stump of left lower extremity    Pre-diabetes    Contact dermatitis due to adhesive bandage    Iron deficiency anemia    Hypoproteinemia    Wheelchair confinement status    History of osteomyelitis    Atrial fibrillation    Chronic anemia    Alcohol abuse    Generalized edema    Adjustment disorder with mixed disturbance of emotions and conduct         Current Outpatient Medications on File Prior to Encounter   Medication Sig Dispense Refill    apixaban (ELIQUIS) 2.5 mg Tab Take 5 mg by mouth once daily at 6am.      clobetasol 0.05% (TEMOVATE) 0.05 % Oint Apply 1 application topically 2 (two) times daily. Apply to left BKA stump twice/day. 1 each 3    gabapentin (NEURONTIN) 300 MG capsule Take 300 mg by mouth 2 (two) times daily.      hydrALAZINE (APRESOLINE) 25 MG tablet Take 25 mg by mouth once daily at 6am.      iron 18 mg Tab 65 mg      potassium chloride SA (K-DUR,KLOR-CON) 20 MEQ tablet Take 20 mEq by mouth once daily at 6am.      spironolactone (ALDACTONE) 50 MG tablet Take 50 mg by mouth once daily at 6am.      triamcinolone acetonide 0.5% (KENALOG) 0.5 % Crea APPLY THIN LAYER TO SKIN TO RASH TWICE DAILY AS NEEDED ITCHING 15 g 3     No current facility-administered medications on file prior to encounter.         Physical Exam Vitals & Measurements:   Vitals:    07/18/22 0814   BP: 133/80   Pulse: 69   Temp: 97.9 °F (36.6 °C)         General:  VSS; afebrile. Well-nourished; in no acute distress.   Respiratory: breathing even, quiet, and unlabored. No coughing.   Cardiovascular:   Significant stasis dermatitis over right anterior tibia, with moderate non-pitting  brawny edema to RLE.   LLE BKA stump without edema; skin soft and supple.  Scattered hair distribution over BLE, including left BKA stump.  Musculoskeletal: left BKA; arrived via wheelchair; full range of motion of all extremities  Neurologic: A&O X 3; cranial nerves grossly intact  Psychiatric: Calm, cooperative. Responses appropriate.    Integumentary:  Laterally located partial-thickness wound:  Wound dimensions smaller from last visit.  Wound very small.    100% pink pale smooth tissue.  No periwound erythema; no purulent drainage, or odors. Wound margins without induration, bogginess, or fluctuance.             Wound 05/09/22 0931 Venous Ulcer Left lateral Leg #1 (Active)   05/09/22 0931    Pre-existing:    Primary Wound Type: Venous ulcer   Side: Left   Orientation: lateral   Location: Leg   Wound Number: #1   Ankle-Brachial Index:    Pulses:    Removal Indication and Assessment:    Wound Outcome:    (Retired) Wound Type:    (Retired) Wound Length (cm):    (Retired) Wound Width (cm):    (Retired) Depth (cm):    Wound Description (Comments):    Removal Indications:    Dressing Appearance Intact 07/18/22 0815   Drainage Amount Scant 07/18/22 0815   Tissue loss description Partial thickness 07/18/22 0815   Wound Length (cm) 0.2 cm 07/18/22 0815   Wound Width (cm) 0.2 cm 07/18/22 0815   Wound Depth (cm) 0.1 cm 07/18/22 0815   Wound Volume (cm^3) 0.004 cm^3 07/18/22 0815   Wound Surface Area (cm^2) 0.04 cm^2 07/18/22 0815                         Assessment/Plan:    Disruption external operation, subsequent visit (BKA due to osteomyelitis):  S/P left BKA 7/9/2021 with final closure on 7/12/2021.   S/P debridement in OR here @ [facility] on 9/16/2021, with LTACH stay @ [facility], where he received NPWT and IV antibiotics.   [facility] is following Mr. Bone for LLE prosthetic leg.        Open wound to left BKA (originally) to fat layer:  Wound is just about healed; however, is healing very slowly.  Has responded  well to steroids; has not responded well to multiple other topical products, including Stimulen, medihoney, collegan, and PolyMem.    Wound Care Today/New Wound Care Treatment Plan:  Clean wound with Dakins 0.25% wound and pat dry.  Apply DrawTex, followed by small piece of gauze, followed by paper tape, followed by stump  to secure dressing.   To be done Thursday of this week.      History of Osteomyelitis:  5/23/2022 XR of left knee/stump did not show osteomyelitis.    5/26/2022 labs:  CRP 4  Treated with 6 weeks of IV antibiotics at [facility] back in October 2021.      Contact Allergic Dermatitis:  Resolved with avoiding all tapes except for paper tape.  Prescribed Clobetasol 0.05% topical ointment twice/daily for any recurrent erythema and itching.   Mr. Bone cannot tolerate tapes other than paper tape.  Unable to tolerate Versatel, steristrips, or adhesive bandages.   Unable to resume advanced skin substitutes due to severe allergic contact dermatitis with steristrips and Versatel to secure skin substitutes.      Left BKA wound Infection:  Treated on 4/28/2022.    Wound Culture Results:  4/21/2022: Enterobacter cloacae complex and MSSA.   Treated with Bactrim DS 1 tab Q12H for ten days (4/28/2022).      ETOH abuse:  7/18/2022:  Self-reported intake of 2 beers/day.   Last pre-albumin level of 26.  I have counseled Mr. Bone on long-term risks associated with daily ETOH intake, including delayed wound healing.  Cessation encouraged.   As above, need to get him up and ambulating with prosthetic leg to avoid worsening depression of mood, with increased ETOH intake.     Anemia:  This has been identified as a co-factor towards impaired wound healing.   4/26/2022: H&H 15.1 & 45.3.   10/30/2021: H&H 12.1 & 39.5. Plts 265. MCV 91.2. MCH 27.9.  10/24/2021: Iron 38. Ferritin 67.3. Folate nl. Prealbumin 21.0. B12 lvl 490. RBC 3.92. H&H 10.8 & 35.9. Plts 254. MCV and MCH low @ 27.6.   9/16/2021: RBC 2.75. H&H 7.9  & 25.7. Plts 248. MCV and MCH normal.   Prescribed ferrous sulfate 324 mg, with Vitamin C 500 mg daily. Handout given on foods higher in iron during previous visit.     Hypoproteinemia:  Resolved.   5/26/2022:  Pre-albumin 26.   4/26/2022: Albumin 4.1.    10/24/2021: pre-albumin 21.0.  7/21/2021: Albumin 2.6.   Prescribed 2-week course of Matt on 11/5/21.   Handout given on foods higher in protein to include/increase in diet during previous wound clinic visit.    Pre-diabetes with peripheral neuropathy:  4/26/2022: HgbA1C 6.0%.   10/30/2021: eGFR 65, with CR 1.19. HgbA1C 5.6. LDL 90.   7/2/2021: HgbA1C 5.9. Not prescribed any antidiabetic medications.   Being managed by PCP.    Wheelchair confined status:  This has been identified as a co-factor towards impaired wound healing and worsening mood and ETOH intake.                 RTC one week to re-evaluate response to DrawTex.   Instructed on s/s of deterioration to call wound clinic for promptly in between clinic visits so we can schedule him that day, or the next day.  Copy of today's visit faxed to [facility].

## 2022-07-25 ENCOUNTER — HOSPITAL ENCOUNTER (OUTPATIENT)
Dept: WOUND CARE | Facility: HOSPITAL | Age: 67
Discharge: HOME | End: 2022-07-25
Attending: NURSE PRACTITIONER
Payer: MEDICARE

## 2022-07-25 VITALS
DIASTOLIC BLOOD PRESSURE: 60 MMHG | HEART RATE: 56 BPM | TEMPERATURE: 99 F | RESPIRATION RATE: 18 BRPM | SYSTOLIC BLOOD PRESSURE: 123 MMHG

## 2022-07-25 DIAGNOSIS — Z87.39 HISTORY OF OSTEOMYELITIS: ICD-10-CM

## 2022-07-25 DIAGNOSIS — L97.922 NON-PRESSURE ULCER OF LEFT LOWER EXTREMITY WITH FAT LAYER EXPOSED: Primary | ICD-10-CM

## 2022-07-25 DIAGNOSIS — R73.03 PRE-DIABETES: ICD-10-CM

## 2022-07-25 DIAGNOSIS — T81.89XD NON-HEALING SURGICAL WOUND, SUBSEQUENT ENCOUNTER: ICD-10-CM

## 2022-07-25 DIAGNOSIS — F10.10 ALCOHOL ABUSE: ICD-10-CM

## 2022-07-25 DIAGNOSIS — I48.20 CHRONIC ATRIAL FIBRILLATION: ICD-10-CM

## 2022-07-25 DIAGNOSIS — Z99.3 WHEELCHAIR CONFINEMENT STATUS: ICD-10-CM

## 2022-07-25 DIAGNOSIS — D64.9 CHRONIC ANEMIA: ICD-10-CM

## 2022-07-25 DIAGNOSIS — T87.9 COMPLICATION OF AMPUTATION STUMP OF LEFT LOWER EXTREMITY: ICD-10-CM

## 2022-07-25 DIAGNOSIS — Z89.512 STATUS POST BELOW-KNEE AMPUTATION OF LEFT LOWER EXTREMITY: ICD-10-CM

## 2022-07-25 DIAGNOSIS — Z79.01 LONG TERM (CURRENT) USE OF ANTICOAGULANTS: ICD-10-CM

## 2022-07-25 PROCEDURE — 99213 PR OFFICE/OUTPT VISIT, EST, LEVL III, 20-29 MIN: ICD-10-PCS | Mod: ,,, | Performed by: NURSE PRACTITIONER

## 2022-07-25 PROCEDURE — 99211 OFF/OP EST MAY X REQ PHY/QHP: CPT

## 2022-07-25 PROCEDURE — 99213 OFFICE O/P EST LOW 20 MIN: CPT | Mod: ,,, | Performed by: NURSE PRACTITIONER

## 2022-07-25 NOTE — PROGRESS NOTES
Chief Complaint:  Non-healing left BKA Stump Wound     History of Present Illness:  65 yo White male being seen for follow-up of post-operative left BKA stump following surgery done here @ [facility] on 7/9/2021, complicated by osteomyelitis, and dehiscence of incision line. Hospitalized 9/13/2021 - 9/16/2021 for OR debridement of significant non-healing stump wound. Discharged to [facility] on 9/16/2021 for ongoing IV antibiotics and NPWT. Discharged home 10/13/2021, with [employer]  Mr. Bone's new phone number: [phone].     Hx includes pre-diabetes, CKD2, A. fib (Eliquis), bradycardia, anemia, GERD, BPH, HLD, HTN, ETOH abuse (2 beers/day reported 7/18/2022), and obesity (BMI 31.66). During July hospitalization, he was noted to be bradycardic and underwent a Lexiscan which was negative for any ischemia or scarring. Echo performed 7/5/2021 showed reduced ejection fraction at 45 to 50% and biatrial enlargement. Never smoker.  with one grown son. Has an apartment of his own that is handicap friendly inside; however, outside is not. PCP is Dr. Giovany Duron in [place].  Current wound care to left stump: cleaning with Dakins 0.25%, followed by DrawTex, followed by gauze, followed by paper tape, followed by stump .    Advanced skin substitutes: [employer] [facility] applied #1 4/7/2022, and #2 on 4/14/2022. Wound did not respond to plain collagen. Mr. Bone cannot have any kind of tape (other than paper) on skin due to severe contact allergic dermatitis. Bactrim DS prescribed 4/28/2022 Q12H X ten days.     Review of Most Recent Labs:  5/26/2022:  Pre-albumin 26.  CRP 4  4/26/2022: eGFR >60, with CR 0.9. Albumin 4.1. HgbA1C 6.0%. H&H 15.1 & 45.3.   10/30/2021: eGFR 65, with CR 1.19. HgbA1C 5.6. LDL 90. WBC 7.2. H&H 12.1 & 39.5. Plts 265. MCV 91.2. MCH 27.9.  10/24/2021: eGFR 62.0, with CR 1.24. Iron 38. Ferritin 67.3. Folate nl. Prealbumin 21.0. B12 lvl 490. WBC 7.6. RBC 3.92.  H&H 10.8 &* 35.9. Plts 254. MCV and MCH low @ 27.6.   2021: eGFR >105. WBC 8.6. RBC 2.75. H&H 7.9 & 25.7. Plts 248. MCV and MCH normal.   2021: eGFR 69, with CR 1.14. AST 37. Albumin 2.6. H&H 9.6 & 31.1. Plts 355. MCV and MCH normal.   2021: HgbA1C 5.9.     Wound Culture Results:  2022: Enterobacter cloacae complex and MSSA. Both of those are susceptible to Bactrim.  Treated with 10 days of Bactrim BID (2022)  2022: MSSA. Doxycycline began on 4/6/2022 X 5 days.    Imagin2022:  XR left knee/stump:  No evidence of osteo  2021 CT of LLE: No evidence of osteo or septic arthritis.  No lower extremity vascular studies on chart.  Left popliteal artery dopplered 2022.        Today 2022:  No reported complications with wound care or current treatment plan.  Has all wound care supplies in home.  Denies fevers, chills, wound odor, wound redness, wound pain, or yellow/green drainage.  No new rashes, lesions, or skin breakdown.  [facility] scheduled to see him tomorrow for prosthetic leg fitting.    2022:  [facility] saw him since last wound clinic visit; wearing a Size 3 stump .  To be fitted for prosthetic leg this Thursday.  No reported complications with wound care or current treatment plan.  Has all wound care supplies in home.  Denies fevers, chills, wound odor, wound redness, wound pain, or yellow/green drainage.  No new rashes, lesions, or skin breakdown.  Has cut back from 4 beers/day to 2 beers/day, per self-report.      2022:  No reported complications with wound care or current treatment plan.  Used up all of the Stimulen powder.   Denies fevers, chills, wound odor, wound redness, wound pain, or yellow/green drainage.  No new rashes, lesions, or skin breakdown.     2022:  No reported complications with wound care or current treatment plan.  Has all wound care supplies in home.  Denies fevers, chills, wound odor, wound redness, wound  pain, or yellow/green drainage.  No new rashes, lesions, or skin breakdown.       6/20/2022:  No reported complications with wound care or current treatment plan.  Has all wound care supplies in home.  Denies fevers, chills, wound odor, wound redness, wound pain, or yellow/green drainage.  No new rashes, lesions, or skin breakdown.  Was told in the past that he would most likely need a PMM.  Denies dizziness, weakness, or symptoms with positional changes.  Not taking any BP medications that will decrease heart rate.  Continues taking Eliquis, as prescribed.  Is driving himself to wound clinic.      6/6/2022:  Swelling has resolved over left BKA stump, as well as RLE, with changes in diet to avoid salt.  He is drinking lime water.  No reported complications with wound care or current treatment plan.  Has all wound care supplies in home.  Denies fevers, chills, wound odor, wound redness, wound pain, or yellow/green drainage.  No new rashes, lesions, or skin breakdown.  Did not take morning medications this morning.       Review of Systems:   Except as stated in HPI, all other 10 body systems normal      Patient Active Problem List   Diagnosis    Status post below-knee amputation of left lower extremity    Non-healing surgical wound    Complication of amputation stump of left lower extremity    Pre-diabetes    Contact dermatitis due to adhesive bandage    Iron deficiency anemia    Hypoproteinemia    Wheelchair confinement status    History of osteomyelitis    Atrial fibrillation    Chronic anemia    Alcohol abuse    Generalized edema    Adjustment disorder with mixed disturbance of emotions and conduct         Current Outpatient Medications on File Prior to Encounter   Medication Sig Dispense Refill    apixaban (ELIQUIS) 2.5 mg Tab Take 5 mg by mouth once daily at 6am.      clobetasol 0.05% (TEMOVATE) 0.05 % Oint Apply 1 application topically 2 (two) times daily. Apply to left BKA stump twice/day. 1  each 3    gabapentin (NEURONTIN) 300 MG capsule Take 300 mg by mouth 2 (two) times daily.      hydrALAZINE (APRESOLINE) 25 MG tablet Take 25 mg by mouth once daily at 6am.      iron 18 mg Tab 65 mg      potassium chloride SA (K-DUR,KLOR-CON) 20 MEQ tablet Take 20 mEq by mouth once daily at 6am.      spironolactone (ALDACTONE) 50 MG tablet Take 50 mg by mouth once daily at 6am.      triamcinolone acetonide 0.5% (KENALOG) 0.5 % Crea APPLY THIN LAYER TO SKIN TO RASH TWICE DAILY AS NEEDED ITCHING 15 g 3     No current facility-administered medications on file prior to encounter.         Physical Exam Vitals & Measurements:   Vitals:    07/25/22 1052   BP: 123/60   Pulse: (!) 56   Resp: 18   Temp: 98.6 °F (37 °C)         General:  VSS; afebrile. Well-nourished; in no acute distress.   Respiratory: breathing even, quiet, and unlabored. No coughing.   Cardiovascular:   Significant stasis dermatitis over right anterior tibia, with moderate non-pitting brawny edema to RLE.   LLE BKA stump without edema; skin soft and supple.  Scattered hair distribution over BLE, including left BKA stump.    Musculoskeletal: left BKA; arrived via wheelchair; full range of motion of all extremities  Neurologic: A&O X 3; cranial nerves grossly intact  Psychiatric: Calm, cooperative. Responses appropriate.    Integumentary:  Laterally located partial-thickness wound:  Wound dimensions smaller from last visit.  Wound very small.    100% pink pale smooth tissue.  No periwound erythema; no purulent drainage, or odors. Wound margins without induration, bogginess, or fluctuance.             Wound 05/09/22 0931 Venous Ulcer Left lateral Leg #1 (Active)   05/09/22 0931    Pre-existing:    Primary Wound Type: Venous ulcer   Side: Left   Orientation: lateral   Location: Leg   Wound Number: #1   Ankle-Brachial Index:    Pulses:    Removal Indication and Assessment:    Wound Outcome:    (Retired) Wound Type:    (Retired) Wound Length (cm):     (Retired) Wound Width (cm):    (Retired) Depth (cm):    Wound Description (Comments):    Removal Indications:    Wound Length (cm) 0.1 cm 07/25/22 1000   Wound Width (cm) 0.1 cm 07/25/22 1000   Wound Surface Area (cm^2) 0.01 cm^2 07/25/22 1000                     Assessment/Plan:    Disruption external operation, subsequent visit (BKA due to osteomyelitis):  S/P left BKA 7/9/2021 with final closure on 7/12/2021.   S/P debridement in OR here @ [facility] on 9/16/2021, with LTACH stay @ [facility], where he received NPWT and IV antibiotics.   [facility] is following Mr. Bone for LLE prosthetic leg.        Open wound to left BKA (originally) to fat layer:  Wound is just about healed; however, is healing very slowly.  Healed by one-half size with DrawTex.   Has responded well to steroids; has not responded well to multiple other topical products, including Stimulen, medihoney, collegan, and PolyMem.    Wound Care Today/Continued Wound Care Treatment Plan:  Clean wound with Dakins 0.25% wound and pat dry.  Apply DrawTex, followed by small piece of gauze, followed by paper tape, followed by stump  to secure dressing.   To be done on Thursday/Monday schedule.      History of Osteomyelitis:  5/23/2022 XR of left knee/stump did not show osteomyelitis.    5/26/2022 labs:  CRP 4  Treated with 6 weeks of IV antibiotics at [facility] back in October 2021.      Contact Allergic Dermatitis:  Resolved with avoiding all tapes except for paper tape.  Prescribed Clobetasol 0.05% topical ointment twice/daily for any recurrent erythema and itching.   Mr. Bone cannot tolerate tapes other than paper tape.  Unable to tolerate Versatel, steristrips, or adhesive bandages.   Unable to resume advanced skin substitutes due to severe allergic contact dermatitis with steristrips and Versatel to secure skin substitutes.      Left BKA wound Infection:  Treated on 4/28/2022.    Wound Culture Results:  4/21/2022: Enterobacter cloacae complex  and MSSA.   Treated with Bactrim DS 1 tab Q12H for ten days (4/28/2022).      ETOH abuse:  7/18/2022:  Self-reported intake of 2 beers/day.   Last pre-albumin level of 26.  I have counseled Mr. Bone on long-term risks associated with daily ETOH intake, including delayed wound healing.  Cessation encouraged.   As above, need to get him up and ambulating with prosthetic leg to avoid worsening depression of mood, with increased ETOH intake.     Anemia:  This has been identified as a co-factor towards impaired wound healing.   4/26/2022: H&H 15.1 & 45.3.   10/30/2021: H&H 12.1 & 39.5. Plts 265. MCV 91.2. MCH 27.9.  10/24/2021: Iron 38. Ferritin 67.3. Folate nl. Prealbumin 21.0. B12 lvl 490. RBC 3.92. H&H 10.8 & 35.9. Plts 254. MCV and MCH low @ 27.6.   9/16/2021: RBC 2.75. H&H 7.9 & 25.7. Plts 248. MCV and MCH normal.   Prescribed ferrous sulfate 324 mg, with Vitamin C 500 mg daily. Handout given on foods higher in iron during previous visit.     Hypoproteinemia:  Resolved.   5/26/2022:  Pre-albumin 26.   4/26/2022: Albumin 4.1.    10/24/2021: pre-albumin 21.0.  7/21/2021: Albumin 2.6.   Prescribed 2-week course of Matt on 11/5/21.   Handout given on foods higher in protein to include/increase in diet during previous wound clinic visit.    Pre-diabetes with peripheral neuropathy:  4/26/2022: HgbA1C 6.0%.   10/30/2021: eGFR 65, with CR 1.19. HgbA1C 5.6. LDL 90.   7/2/2021: HgbA1C 5.9. Not prescribed any antidiabetic medications.   Being managed by PCP.    Wheelchair confined status:  This has been identified as a co-factor towards impaired wound healing and worsening mood and ETOH intake.                 RTC two weeks.   Instructed on s/s of deterioration to call wound clinic for promptly in between clinic visits so we can schedule him that day, or the next day.

## 2022-08-08 ENCOUNTER — HOSPITAL ENCOUNTER (OUTPATIENT)
Dept: WOUND CARE | Facility: HOSPITAL | Age: 67
Discharge: HOME | End: 2022-08-08
Attending: NURSE PRACTITIONER
Payer: MEDICARE

## 2022-08-08 VITALS
RESPIRATION RATE: 18 BRPM | DIASTOLIC BLOOD PRESSURE: 71 MMHG | HEART RATE: 71 BPM | SYSTOLIC BLOOD PRESSURE: 144 MMHG | TEMPERATURE: 99 F

## 2022-08-08 DIAGNOSIS — L97.922 NON-PRESSURE ULCER OF LEFT LOWER EXTREMITY WITH FAT LAYER EXPOSED: Primary | ICD-10-CM

## 2022-08-08 DIAGNOSIS — Z87.39 HISTORY OF OSTEOMYELITIS: ICD-10-CM

## 2022-08-08 DIAGNOSIS — Z99.3 WHEELCHAIR CONFINEMENT STATUS: ICD-10-CM

## 2022-08-08 DIAGNOSIS — F10.10 ALCOHOL ABUSE: ICD-10-CM

## 2022-08-08 DIAGNOSIS — D64.9 CHRONIC ANEMIA: ICD-10-CM

## 2022-08-08 DIAGNOSIS — R73.03 PRE-DIABETES: ICD-10-CM

## 2022-08-08 DIAGNOSIS — T81.89XD NON-HEALING SURGICAL WOUND, SUBSEQUENT ENCOUNTER: ICD-10-CM

## 2022-08-08 DIAGNOSIS — T87.9 COMPLICATION OF AMPUTATION STUMP OF LEFT LOWER EXTREMITY: ICD-10-CM

## 2022-08-08 PROCEDURE — 99211 OFF/OP EST MAY X REQ PHY/QHP: CPT

## 2022-08-08 PROCEDURE — 99213 PR OFFICE/OUTPT VISIT, EST, LEVL III, 20-29 MIN: ICD-10-PCS | Mod: ,,, | Performed by: NURSE PRACTITIONER

## 2022-08-08 PROCEDURE — 99213 OFFICE O/P EST LOW 20 MIN: CPT | Mod: ,,, | Performed by: NURSE PRACTITIONER

## 2022-08-08 NOTE — PROGRESS NOTES
Chief Complaint:  Non-healing left BKA Stump Wound     History of Present Illness:  65 yo White male being seen for follow-up of post-operative left BKA stump following surgery done here @ [facility] on 7/9/2021, complicated by osteomyelitis, and dehiscence of incision line. Hospitalized 9/13/2021 - 9/16/2021 for OR debridement of significant non-healing stump wound. Discharged to [facility] on 9/16/2021 for ongoing IV antibiotics and NPWT. Discharged home 10/13/2021, with [employer]  Mr. Bone's new phone number: [phone].     Hx includes pre-diabetes, CKD2, A. fib (Eliquis), bradycardia, anemia, GERD, BPH, HLD, HTN, ETOH abuse (2 beers/day reported 7/18/2022), and obesity (BMI 31.66). During July hospitalization, he was noted to be bradycardic and underwent a Lexiscan which was negative for any ischemia or scarring. Echo performed 7/5/2021 showed reduced ejection fraction at 45 to 50% and biatrial enlargement. Never smoker.  with one grown son. Has an apartment of his own that is handicap friendly inside; however, outside is not. PCP is Dr. Giovany Duron in [place].  Current wound care to left stump: cleaning with Dakins 0.25%, followed by DrawTex, followed by gauze, followed by paper tape, followed by stump .    Advanced skin substitutes: [employer] [facility] applied #1 4/7/2022, and #2 on 4/14/2022. Wound did not respond to plain collagen. Mr. Bone cannot have any kind of tape (other than paper) on skin due to severe contact allergic dermatitis. Bactrim DS prescribed 4/28/2022 Q12H X ten days.     Review of Most Recent Labs:  5/26/2022:  Pre-albumin 26.  CRP 4  4/26/2022: eGFR >60, with CR 0.9. Albumin 4.1. HgbA1C 6.0%. H&H 15.1 & 45.3.   10/30/2021: eGFR 65, with CR 1.19. HgbA1C 5.6. LDL 90. WBC 7.2. H&H 12.1 & 39.5. Plts 265. MCV 91.2. MCH 27.9.  10/24/2021: eGFR 62.0, with CR 1.24. Iron 38. Ferritin 67.3. Folate nl. Prealbumin 21.0. B12 lvl 490. WBC 7.6. RBC 3.92.  H&H 10.8 &* 35.9. Plts 254. MCV and MCH low @ 27.6.   2021: eGFR >105. WBC 8.6. RBC 2.75. H&H 7.9 & 25.7. Plts 248. MCV and MCH normal.   2021: eGFR 69, with CR 1.14. AST 37. Albumin 2.6. H&H 9.6 & 31.1. Plts 355. MCV and MCH normal.   2021: HgbA1C 5.9.     Wound Culture Results:  2022: Enterobacter cloacae complex and MSSA. Both of those are susceptible to Bactrim.  Treated with 10 days of Bactrim BID (2022)  2022: MSSA. Doxycycline began on 4/6/2022 X 5 days.    Imagin2022:  XR left knee/stump:  No evidence of osteo  2021 CT of LLE: No evidence of osteo or septic arthritis.  No lower extremity vascular studies on chart.  Left popliteal artery dopplered 2022.        Today 2022:  Scheduled to get left BKA prosthetic leg Wed of this week.  Home health is going to evaluate him for home therapy for gait training versus going inpatient for that therapy.  SN has discharged him from home health.  No reported complications with wound care or current treatment plan.  Has all wound care supplies in home.  Denies fevers, chills, wound odor, wound redness, wound pain, or yellow/green drainage.  No new rashes, lesions, or skin breakdown.     2022:  No reported complications with wound care or current treatment plan.  Has all wound care supplies in home.  Denies fevers, chills, wound odor, wound redness, wound pain, or yellow/green drainage.  No new rashes, lesions, or skin breakdown.  [facility] scheduled to see him tomorrow for prosthetic leg fitting.    2022:  [facility] saw him since last wound clinic visit; wearing a Size 3 stump .  To be fitted for prosthetic leg this Thursday.  No reported complications with wound care or current treatment plan.  Has all wound care supplies in home.  Denies fevers, chills, wound odor, wound redness, wound pain, or yellow/green drainage.  No new rashes, lesions, or skin breakdown.  Has cut back from 4 beers/day to  2 beers/day, per self-report.      7/6/2022:  No reported complications with wound care or current treatment plan.  Used up all of the Stimulen powder.   Denies fevers, chills, wound odor, wound redness, wound pain, or yellow/green drainage.  No new rashes, lesions, or skin breakdown.     6/27/2022:  No reported complications with wound care or current treatment plan.  Has all wound care supplies in home.  Denies fevers, chills, wound odor, wound redness, wound pain, or yellow/green drainage.  No new rashes, lesions, or skin breakdown.       6/20/2022:  No reported complications with wound care or current treatment plan.  Has all wound care supplies in home.  Denies fevers, chills, wound odor, wound redness, wound pain, or yellow/green drainage.  No new rashes, lesions, or skin breakdown.  Was told in the past that he would most likely need a PMM.  Denies dizziness, weakness, or symptoms with positional changes.  Not taking any BP medications that will decrease heart rate.  Continues taking Eliquis, as prescribed.  Is driving himself to wound clinic.      6/6/2022:  Swelling has resolved over left BKA stump, as well as RLE, with changes in diet to avoid salt.  He is drinking lime water.  No reported complications with wound care or current treatment plan.  Has all wound care supplies in home.  Denies fevers, chills, wound odor, wound redness, wound pain, or yellow/green drainage.  No new rashes, lesions, or skin breakdown.  Did not take morning medications this morning.       Review of Systems:   Except as stated in HPI, all other 10 body systems normal      Patient Active Problem List   Diagnosis    Status post below-knee amputation of left lower extremity    Non-healing surgical wound    Complication of amputation stump of left lower extremity    Pre-diabetes    Contact dermatitis due to adhesive bandage    Iron deficiency anemia    Hypoproteinemia    Wheelchair confinement status    History of  osteomyelitis    Atrial fibrillation    Chronic anemia    Alcohol abuse    Generalized edema    Adjustment disorder with mixed disturbance of emotions and conduct         Current Outpatient Medications on File Prior to Encounter   Medication Sig Dispense Refill    apixaban (ELIQUIS) 2.5 mg Tab Take 5 mg by mouth once daily at 6am.      clobetasol 0.05% (TEMOVATE) 0.05 % Oint Apply 1 application topically 2 (two) times daily. Apply to left BKA stump twice/day. (Patient not taking: Reported on 8/8/2022) 1 each 3    gabapentin (NEURONTIN) 300 MG capsule Take 300 mg by mouth 2 (two) times daily.      hydrALAZINE (APRESOLINE) 25 MG tablet Take 25 mg by mouth once daily at 6am.      iron 18 mg Tab 65 mg      potassium chloride SA (K-DUR,KLOR-CON) 20 MEQ tablet Take 20 mEq by mouth once daily at 6am.      spironolactone (ALDACTONE) 50 MG tablet Take 50 mg by mouth once daily at 6am.      triamcinolone acetonide 0.5% (KENALOG) 0.5 % Crea APPLY THIN LAYER TO SKIN TO RASH TWICE DAILY AS NEEDED ITCHING (Patient not taking: Reported on 8/8/2022) 15 g 3     No current facility-administered medications on file prior to encounter.         Physical Exam Vitals & Measurements:   Vitals:    08/08/22 0833   BP: (!) 144/71   Pulse: 71   Resp: 18   Temp: 98.6 °F (37 °C)         General:  VSS; afebrile. Well-nourished; in no acute distress.   Respiratory: breathing even, quiet, and unlabored. No coughing.   Cardiovascular:   Moderate stasis dermatitis over right anterior tibia, with moderate non-pitting brawny edema to RLE.   LLE BKA stump without edema; skin soft and supple.  Scattered hair distribution over BLE, including left BKA stump.    Musculoskeletal: left BKA; arrived via wheelchair; full range of motion of all extremities  Neurologic: A&O X 3; cranial nerves grossly intact  Psychiatric: Calm, cooperative. Responses appropriate.    Integumentary:  Laterally located partial-thickness wound:  Wound covered with 100%  brown, thin, dry, and crusted debris.  Wound is pinpoint in size.   No periwound erythema; no purulent drainage, or odors. Wound margins without induration, bogginess, or fluctuance.             Wound 05/09/22 0931 Venous Ulcer Left lateral Leg #1 (Active)   05/09/22 0931    Pre-existing:    Primary Wound Type: Venous ulcer   Side: Left   Orientation: lateral   Location: Leg   Wound Number: #1   Ankle-Brachial Index:    Pulses:    Removal Indication and Assessment:    Wound Outcome:    (Retired) Wound Type:    (Retired) Wound Length (cm):    (Retired) Wound Width (cm):    (Retired) Depth (cm):    Wound Description (Comments):    Removal Indications:    Wound Length (cm) 0.1 cm 08/08/22 0835   Wound Width (cm) 0.1 cm 08/08/22 0835   Wound Depth (cm) 0.1 cm 08/08/22 0835   Wound Volume (cm^3) 0.001 cm^3 08/08/22 0835   Wound Surface Area (cm^2) 0.01 cm^2 08/08/22 0835                         Assessment/Plan:    Disruption external operation, subsequent visit (BKA due to osteomyelitis):  S/P left BKA 7/9/2021 with final closure on 7/12/2021.   S/P debridement in OR here @ [facility] on 9/16/2021, with LTACH stay @ [facility], where he received NPWT and IV antibiotics.   [facility] is following Mr. Bone for LLE prosthetic leg.        Open wound to left BKA (originally) to fat layer:  Using an alley board, I was able to gently sand down crusted debris to underlying soft dry peeling skin.     Wound Care Today/New Wound Care Treatment Plan:  Paint with betadine daily, followed by small piece of gauze, followed by paper tape, followed by stump  to secure dressing.   To be done daily.    Teaching provided on remodeling phase of wound healing, risk of wound reopening, and s/s of deterioration to contact wound clinic promptly for, with rationale.      History of Osteomyelitis:  5/23/2022 XR of left knee/stump did not show osteomyelitis.    5/26/2022 labs:  CRP 4  Treated with 6 weeks of IV antibiotics at [facility] back in  October 2021.      Contact Allergic Dermatitis:  Resolved with avoiding all tapes except for paper tape.  Prescribed Clobetasol 0.05% topical ointment twice/daily for any recurrent erythema and itching.   Mr. Bone cannot tolerate tapes other than paper tape.  Unable to tolerate Versatel, steristrips, or adhesive bandages.       Left BKA wound Infection:  Treated on 4/28/2022.    Wound Culture Results:  4/21/2022: Enterobacter cloacae complex and MSSA.   Treated with Bactrim DS 1 tab Q12H for ten days (4/28/2022).      ETOH abuse:  7/18/2022:  Self-reported intake of 2 beers/day.   Last pre-albumin level of 26.  I have counseled Mr. Bone on long-term risks associated with daily ETOH intake, including delayed wound healing.  Cessation encouraged.       Anemia:  This has been identified as a co-factor towards impaired wound healing.   4/26/2022: H&H 15.1 & 45.3.   10/30/2021: H&H 12.1 & 39.5. Plts 265. MCV 91.2. MCH 27.9.  10/24/2021: Iron 38. Ferritin 67.3. Folate nl. Prealbumin 21.0. B12 lvl 490. RBC 3.92. H&H 10.8 & 35.9. Plts 254. MCV and MCH low @ 27.6.   9/16/2021: RBC 2.75. H&H 7.9 & 25.7. Plts 248. MCV and MCH normal.   Prescribed ferrous sulfate 324 mg, with Vitamin C 500 mg daily.     Hypoproteinemia:  Resolved.   5/26/2022:  Pre-albumin 26.   4/26/2022: Albumin 4.1.    10/24/2021: pre-albumin 21.0.  7/21/2021: Albumin 2.6.   Prescribed 2-week course of Matt on 11/5/21.     Pre-diabetes with peripheral neuropathy:  4/26/2022: HgbA1C 6.0%.   10/30/2021: eGFR 65, with CR 1.19. HgbA1C 5.6. LDL 90.   7/2/2021: HgbA1C 5.9. Not prescribed any antidiabetic medications.   Being managed by PCP.    Wheelchair confined status:  This has been identified as a co-factor towards impaired wound healing and worsening mood and ETOH intake.                 RTC two weeks.   Instructed on s/s of deterioration to call wound clinic for promptly in between clinic visits so we can schedule him that day, or the next day.

## 2022-08-22 ENCOUNTER — HOSPITAL ENCOUNTER (OUTPATIENT)
Dept: WOUND CARE | Facility: HOSPITAL | Age: 67
Discharge: HOME | End: 2022-08-22
Attending: NURSE PRACTITIONER
Payer: MEDICARE

## 2022-08-22 VITALS — HEART RATE: 76 BPM | DIASTOLIC BLOOD PRESSURE: 69 MMHG | SYSTOLIC BLOOD PRESSURE: 131 MMHG | RESPIRATION RATE: 20 BRPM

## 2022-08-22 DIAGNOSIS — T87.9 COMPLICATION OF AMPUTATION STUMP OF LEFT LOWER EXTREMITY: ICD-10-CM

## 2022-08-22 DIAGNOSIS — R73.03 PRE-DIABETES: ICD-10-CM

## 2022-08-22 DIAGNOSIS — T81.89XD NON-HEALING SURGICAL WOUND, SUBSEQUENT ENCOUNTER: Primary | ICD-10-CM

## 2022-08-22 DIAGNOSIS — D64.9 CHRONIC ANEMIA: ICD-10-CM

## 2022-08-22 DIAGNOSIS — L97.922 NON-PRESSURE ULCER OF LEFT LOWER EXTREMITY WITH FAT LAYER EXPOSED: ICD-10-CM

## 2022-08-22 DIAGNOSIS — F10.10 ALCOHOL ABUSE: ICD-10-CM

## 2022-08-22 DIAGNOSIS — S81.801A TRAUMATIC OPEN WOUND OF RIGHT LOWER LEG, INITIAL ENCOUNTER: ICD-10-CM

## 2022-08-22 DIAGNOSIS — Z87.39 HISTORY OF OSTEOMYELITIS: ICD-10-CM

## 2022-08-22 PROCEDURE — 99213 OFFICE O/P EST LOW 20 MIN: CPT | Mod: ,,, | Performed by: NURSE PRACTITIONER

## 2022-08-22 PROCEDURE — 99213 PR OFFICE/OUTPT VISIT, EST, LEVL III, 20-29 MIN: ICD-10-PCS | Mod: ,,, | Performed by: NURSE PRACTITIONER

## 2022-08-22 PROCEDURE — 99211 OFF/OP EST MAY X REQ PHY/QHP: CPT

## 2022-08-22 NOTE — PROGRESS NOTES
Chief Complaint:  Non-healing left BKA Stump Wound     History of Present Illness:  65 yo White male being seen for follow-up of post-operative left BKA stump following surgery done @ [facility] on 7/9/2021, complicated by osteomyelitis, and dehiscence of incision line. Hospitalized 9/13/2021 - 9/16/2021 for OR debridement of significant non-healing stump wound. Discharged to [facility] on 9/16/2021 for ongoing IV antibiotics and NPWT. Discharged home 10/13/2021, with [facility] [facility] ([place]).  Home health  has discharged him (8/22/2022), physical therapy continues for gait training.  Mr. Bone's new phone number: [phone].     Hx includes pre-diabetes, CKD2, A. fib (Eliquis), bradycardia, anemia, GERD, BPH, HLD, HTN, ETOH abuse (2 beers/day reported 8/22/2022), and obesity (BMI 31.66). During July hospitalization, he was noted to be bradycardic and underwent a Lexiscan which was negative for any ischemia or scarring. Echo performed 7/5/2021 showed reduced ejection fraction at 45 to 50% and biatrial enlargement. Never smoker.  with one grown son. Has an apartment of his own that is handicap friendly inside; however, outside is not. PCP is Dr. Giovany Duron in [place].  Current wound care to left stump: painting with betadine, followed by gauze, followed by paper tape, followed by stump .    Advanced skin substitutes: [employer] [facility] applied #1 4/7/2022, and #2 on 4/14/2022. Wound did not respond to plain collagen. Mr. Bone cannot have any kind of tape (other than paper) on skin due to severe contact allergic dermatitis. Bactrim DS prescribed 4/28/2022 Q12H X ten days.     Review of Most Recent Labs:  5/26/2022:  Pre-albumin 26.  CRP 4  4/26/2022: eGFR >60, with CR 0.9. Albumin 4.1. HgbA1C 6.0%. H&H 15.1 & 45.3.   10/30/2021: eGFR 65, with CR 1.19. HgbA1C 5.6. LDL 90. WBC 7.2. H&H 12.1 & 39.5. Plts 265. MCV 91.2. MCH 27.9.  10/24/2021: eGFR 62.0, with CR 1.24. Iron 38. Ferritin  67.3. Folate nl. Prealbumin 21.0. B12 lvl 490. WBC 7.6. RBC 3.92. H&H 10.8 &* 35.9. Plts 254. MCV and MCH low @ 27.6.   2021: eGFR >105. WBC 8.6. RBC 2.75. H&H 7.9 & 25.7. Plts 248. MCV and MCH normal.   2021: eGFR 69, with CR 1.14. AST 37. Albumin 2.6. H&H 9.6 & 31.1. Plts 355. MCV and MCH normal.   2021: HgbA1C 5.9.     Wound Culture Results:  2022: Enterobacter cloacae complex and MSSA. Both of those are susceptible to Bactrim.  Treated with 10 days of Bactrim BID (2022)  2022: MSSA. Doxycycline began on 4/6/2022 X 5 days.    Imagin2022:  XR left knee/stump:  No evidence of osteo  2021 CT of LLE: No evidence of osteo or septic arthritis.  No lower extremity vascular studies on chart.  Left popliteal artery dopplered 2022.        Today 2022:  Has been walking with left prosthetic leg for approximately 1.5 weeks, with walker.  Having some balance issues that he and physical therapist is working on.  Has been driving.  Went to visit older brother yesterday and did not have any problems with prosthetic leg and driving.  Was outside working on his truck this past Saturday and scratched front of his right shin, which he has been applying Neosporin to.  Wound is very small.  No reported complications with wound care or current treatment plan.  Has all wound care supplies in home.  Denies fevers, chills, wound odor, wound redness, wound pain, or yellow/green drainage.  Has leftover silver alginate in home.     2022:  Scheduled to get left BKA prosthetic leg Wed of this week.  Home health is going to evaluate him for home therapy for gait training versus going inpatient for that therapy.  SN has discharged him from home health.  No reported complications with wound care or current treatment plan.  Has all wound care supplies in home.  Denies fevers, chills, wound odor, wound redness, wound pain, or yellow/green drainage.  No new rashes, lesions, or skin breakdown.      7/25/2022:  No reported complications with wound care or current treatment plan.  Has all wound care supplies in home.  Denies fevers, chills, wound odor, wound redness, wound pain, or yellow/green drainage.  No new rashes, lesions, or skin breakdown.  [facility] scheduled to see him tomorrow for prosthetic leg fitting.    7/18/2022:  [facility] saw him since last wound clinic visit; wearing a Size 3 stump .  To be fitted for prosthetic leg this Thursday.  No reported complications with wound care or current treatment plan.  Has all wound care supplies in home.  Denies fevers, chills, wound odor, wound redness, wound pain, or yellow/green drainage.  No new rashes, lesions, or skin breakdown.  Has cut back from 4 beers/day to 2 beers/day, per self-report.      7/6/2022:  No reported complications with wound care or current treatment plan.  Used up all of the Stimulen powder.   Denies fevers, chills, wound odor, wound redness, wound pain, or yellow/green drainage.  No new rashes, lesions, or skin breakdown.     6/27/2022:  No reported complications with wound care or current treatment plan.  Has all wound care supplies in home.  Denies fevers, chills, wound odor, wound redness, wound pain, or yellow/green drainage.  No new rashes, lesions, or skin breakdown.       6/20/2022:  No reported complications with wound care or current treatment plan.  Has all wound care supplies in home.  Denies fevers, chills, wound odor, wound redness, wound pain, or yellow/green drainage.  No new rashes, lesions, or skin breakdown.  Was told in the past that he would most likely need a PMM.  Denies dizziness, weakness, or symptoms with positional changes.  Not taking any BP medications that will decrease heart rate.  Continues taking Eliquis, as prescribed.  Is driving himself to wound clinic.      6/6/2022:  Swelling has resolved over left BKA stump, as well as RLE, with changes in diet to avoid salt.  He is drinking  lime water.  No reported complications with wound care or current treatment plan.  Has all wound care supplies in home.  Denies fevers, chills, wound odor, wound redness, wound pain, or yellow/green drainage.  No new rashes, lesions, or skin breakdown.  Did not take morning medications this morning.       Review of Systems:   Except as stated in HPI, all other 10 body systems normal      Patient Active Problem List   Diagnosis    Status post below-knee amputation of left lower extremity    Non-healing surgical wound    Complication of amputation stump of left lower extremity    Pre-diabetes    Contact dermatitis due to adhesive bandage    Iron deficiency anemia    Hypoproteinemia    Wheelchair confinement status    History of osteomyelitis    Atrial fibrillation    Chronic anemia    Alcohol abuse    Generalized edema    Adjustment disorder with mixed disturbance of emotions and conduct    Non-pressure ulcer of left lower extremity with fat layer exposed         Current Outpatient Medications on File Prior to Encounter   Medication Sig Dispense Refill    apixaban (ELIQUIS) 2.5 mg Tab Take 5 mg by mouth once daily at 6am.      clobetasol 0.05% (TEMOVATE) 0.05 % Oint Apply 1 application topically 2 (two) times daily. Apply to left BKA stump twice/day. (Patient not taking: Reported on 8/8/2022) 1 each 3    gabapentin (NEURONTIN) 300 MG capsule Take 300 mg by mouth 2 (two) times daily.      hydrALAZINE (APRESOLINE) 25 MG tablet Take 25 mg by mouth once daily at 6am.      iron 18 mg Tab 65 mg      potassium chloride SA (K-DUR,KLOR-CON) 20 MEQ tablet Take 20 mEq by mouth once daily at 6am.      spironolactone (ALDACTONE) 50 MG tablet Take 50 mg by mouth once daily at 6am.      triamcinolone acetonide 0.5% (KENALOG) 0.5 % Crea APPLY THIN LAYER TO SKIN TO RASH TWICE DAILY AS NEEDED ITCHING (Patient not taking: Reported on 8/8/2022) 15 g 3     No current facility-administered medications on file prior  to encounter.         Physical Exam Vitals & Measurements:   Vitals:    08/22/22 0836   BP: 131/69   Pulse: 76   Resp: 20         General:  VSS; afebrile. Well-nourished; in no acute distress.   Respiratory: breathing even, quiet, and unlabored. No coughing.   Cardiovascular:   Moderate stasis dermatitis over right anterior tibia, with moderate non-pitting brawny edema to RLE.   LLE BKA stump without edema; skin soft and supple.  Scattered hair distribution over BLE, including left BKA stump.    Musculoskeletal: left BKA; arrived via wheelchair; full range of motion of all extremities  Neurologic: A&O X 3; cranial nerves grossly intact  Psychiatric: Calm, cooperative. Responses appropriate.    Integumentary:  Laterally located partial-thickness wound:  Wound is 100% epithelialized with dry crusted debris, which I was able to lightly sand with an alley board to healthy underlying epithelial skin.  No periwound erythema; no purulent drainage, or odors. Wound margins without induration, bogginess, or fluctuance.      New partial-thickness trauma wound to right anterior tibia:  Mild periwound erythema.  Scant serous drainage.  No purulent drainage, periwound edema, odors, induration, bogginess, or fluctuance.            Wound 08/22/22 0840 Venous Ulcer Right anterior;lower Leg (Active)   08/22/22 0840    Pre-existing: No   Primary Wound Type: Venous ulcer   Side: Right   Orientation: anterior;lower   Location: Leg   Wound Number:    Ankle-Brachial Index:    Pulses:    Removal Indication and Assessment:    Wound Outcome:    (Retired) Wound Type:    (Retired) Wound Length (cm):    (Retired) Wound Width (cm):    (Retired) Depth (cm):    Wound Description (Comments):    Removal Indications:    Wound Image   08/22/22 0837   Drainage Amount Small 08/22/22 0837   Drainage Characteristics/Odor Clear 08/22/22 0837   Appearance Pink 08/22/22 0837   Wound Length (cm) 0.7 cm 08/22/22 0837   Wound Width (cm) 0.5 cm 08/22/22 0837    Wound Depth (cm) 0.1 cm 08/22/22 0837   Wound Volume (cm^3) 0.035 cm^3 08/22/22 0837   Wound Surface Area (cm^2) 0.35 cm^2 08/22/22 0837                             Assessment/Plan:    Disruption external operation, subsequent visit (BKA due to osteomyelitis):  S/P left BKA 7/9/2021 with final closure on 7/12/2021.   S/P debridement in OR here @ [facility] on 9/16/2021, with LTACH stay @ [facility], where he received NPWT and IV antibiotics.   [facility] is following Mr. Bone for LLE prosthetic leg.        Open wound to left BKA (originally) to fat layer:  Using an Bolt HR board, I was able to gently sand down crusted debris to underlying 100% epithelial tissue.     Wound Care Today/New Wound Care Treatment Plan:  Cover with gauze,followed by paper tape, followed by stump  to secure dressing to protect newly healed skin.   To be done daily.    Teaching reinforced on remodeling phase of wound healing, risk of wound reopening, and s/s of deterioration to contact wound clinic promptly for, with rationale.      Trauma wound to right anterior tibia:  No s/s of localized infection.  Very superficial wound.   Wound Care Today/New Wound Care Treatment Plan:  Clean wound with Microcyn or Dakins 0.25%, which Mr. Bone has at home.  Apply collagen, followed by secondary dressing.  To be changed daily.    Instructed Mr. Bone wound should heal within a couple of days.  If not, he is to call wound clinic to schedule an appointment for that leg.      History of Osteomyelitis:  5/23/2022 XR of left knee/stump did not show osteomyelitis.    5/26/2022 labs:  CRP 4  Treated with 6 weeks of IV antibiotics at [facility] back in October 2021.      Contact Allergic Dermatitis:  Resolved with avoiding all tapes except for paper tape.  Prescribed Clobetasol 0.05% topical ointment twice/daily for any recurrent erythema and itching.   Mr. Bone cannot tolerate tapes other than paper tape.  Unable to tolerate Versatel,  steristrips, or adhesive bandages.       Left BKA wound Infection:  Treated on 4/28/2022.    Wound Culture Results:  4/21/2022: Enterobacter cloacae complex and MSSA.   Treated with Bactrim DS 1 tab Q12H for ten days (4/28/2022).      ETOH abuse:  7/18/2022:  Self-reported intake of 2 beers/day.   Last pre-albumin level of 26.  I have counseled Mr. Bone on long-term risks associated with daily ETOH intake, including delayed wound healing.  Falls precautions taught with alcohol and prosthetic leg.  Cessation encouraged.       Anemia:  This had been identified as a co-factor towards impaired wound healing.   4/26/2022: H&H 15.1 & 45.3.   10/30/2021: H&H 12.1 & 39.5. Plts 265. MCV 91.2. MCH 27.9.  10/24/2021: Iron 38. Ferritin 67.3. Folate nl. Prealbumin 21.0. B12 lvl 490. RBC 3.92. H&H 10.8 & 35.9. Plts 254. MCV and MCH low @ 27.6.   9/16/2021: RBC 2.75. H&H 7.9 & 25.7. Plts 248. MCV and MCH normal.   Prescribed ferrous sulfate 324 mg, with Vitamin C 500 mg daily.     Hypoproteinemia:  Resolved.   5/26/2022:  Pre-albumin 26.   4/26/2022: Albumin 4.1.    10/24/2021: pre-albumin 21.0.  7/21/2021: Albumin 2.6.   Prescribed 2-week course of Matt on 11/5/21.     Pre-diabetes with peripheral neuropathy:  4/26/2022: HgbA1C 6.0%.   10/30/2021: eGFR 65, with CR 1.19. HgbA1C 5.6. LDL 90.   7/2/2021: HgbA1C 5.9. Not prescribed any antidiabetic medications.   Being managed by PCP.            We will see Mr. Bone on an as-needed basis for any future wound-related issues.